# Patient Record
Sex: FEMALE | Race: OTHER | HISPANIC OR LATINO | Employment: FULL TIME | URBAN - METROPOLITAN AREA
[De-identification: names, ages, dates, MRNs, and addresses within clinical notes are randomized per-mention and may not be internally consistent; named-entity substitution may affect disease eponyms.]

---

## 2018-09-18 ENCOUNTER — APPOINTMENT (OUTPATIENT)
Dept: URGENT CARE | Facility: MEDICAL CENTER | Age: 38
End: 2018-09-18
Payer: OTHER MISCELLANEOUS

## 2018-09-18 ENCOUNTER — APPOINTMENT (OUTPATIENT)
Dept: RADIOLOGY | Facility: MEDICAL CENTER | Age: 38
End: 2018-09-18
Payer: OTHER MISCELLANEOUS

## 2018-09-18 ENCOUNTER — TRANSCRIBE ORDERS (OUTPATIENT)
Dept: URGENT CARE | Facility: MEDICAL CENTER | Age: 38
End: 2018-09-18

## 2018-09-18 DIAGNOSIS — M54.5 ACUTE RIGHT-SIDED LOW BACK PAIN, WITH SCIATICA PRESENCE UNSPECIFIED: Primary | ICD-10-CM

## 2018-09-18 DIAGNOSIS — M54.5 ACUTE RIGHT-SIDED LOW BACK PAIN, WITH SCIATICA PRESENCE UNSPECIFIED: ICD-10-CM

## 2018-09-18 PROCEDURE — 72100 X-RAY EXAM L-S SPINE 2/3 VWS: CPT

## 2018-09-18 PROCEDURE — 99283 EMERGENCY DEPT VISIT LOW MDM: CPT

## 2018-09-18 PROCEDURE — G0382 LEV 3 HOSP TYPE B ED VISIT: HCPCS

## 2018-09-24 ENCOUNTER — APPOINTMENT (OUTPATIENT)
Dept: URGENT CARE | Facility: MEDICAL CENTER | Age: 38
End: 2018-09-24
Payer: OTHER MISCELLANEOUS

## 2018-09-24 PROCEDURE — 99213 OFFICE O/P EST LOW 20 MIN: CPT

## 2018-10-01 ENCOUNTER — APPOINTMENT (OUTPATIENT)
Dept: URGENT CARE | Facility: MEDICAL CENTER | Age: 38
End: 2018-10-01
Payer: OTHER MISCELLANEOUS

## 2018-10-01 PROCEDURE — 99213 OFFICE O/P EST LOW 20 MIN: CPT

## 2018-10-15 ENCOUNTER — APPOINTMENT (OUTPATIENT)
Dept: URGENT CARE | Facility: MEDICAL CENTER | Age: 38
End: 2018-10-15
Payer: OTHER MISCELLANEOUS

## 2018-10-15 PROCEDURE — 99213 OFFICE O/P EST LOW 20 MIN: CPT

## 2018-10-29 ENCOUNTER — APPOINTMENT (OUTPATIENT)
Dept: URGENT CARE | Facility: MEDICAL CENTER | Age: 38
End: 2018-10-29
Payer: OTHER MISCELLANEOUS

## 2018-10-29 PROCEDURE — 99213 OFFICE O/P EST LOW 20 MIN: CPT

## 2018-11-12 ENCOUNTER — APPOINTMENT (OUTPATIENT)
Dept: URGENT CARE | Facility: MEDICAL CENTER | Age: 38
End: 2018-11-12
Payer: OTHER MISCELLANEOUS

## 2018-11-12 PROCEDURE — 99213 OFFICE O/P EST LOW 20 MIN: CPT

## 2018-11-15 ENCOUNTER — APPOINTMENT (OUTPATIENT)
Dept: URGENT CARE | Facility: MEDICAL CENTER | Age: 38
End: 2018-11-15
Payer: OTHER MISCELLANEOUS

## 2018-11-15 PROCEDURE — 99213 OFFICE O/P EST LOW 20 MIN: CPT

## 2022-03-21 ENCOUNTER — OFFICE VISIT (OUTPATIENT)
Dept: OBGYN CLINIC | Facility: MEDICAL CENTER | Age: 42
End: 2022-03-21
Payer: COMMERCIAL

## 2022-03-21 VITALS
BODY MASS INDEX: 29.64 KG/M2 | DIASTOLIC BLOOD PRESSURE: 60 MMHG | WEIGHT: 157 LBS | SYSTOLIC BLOOD PRESSURE: 110 MMHG | HEIGHT: 61 IN

## 2022-03-21 DIAGNOSIS — Z12.31 ENCOUNTER FOR SCREENING MAMMOGRAM FOR MALIGNANT NEOPLASM OF BREAST: ICD-10-CM

## 2022-03-21 DIAGNOSIS — N91.2 AMENORRHEA: ICD-10-CM

## 2022-03-21 DIAGNOSIS — R10.2 PELVIC PAIN IN FEMALE: ICD-10-CM

## 2022-03-21 DIAGNOSIS — Z01.419 ENCOUNTER FOR ROUTINE GYNECOLOGICAL EXAMINATION WITH PAPANICOLAOU SMEAR OF CERVIX: Primary | ICD-10-CM

## 2022-03-21 PROCEDURE — G0476 HPV COMBO ASSAY CA SCREEN: HCPCS | Performed by: OBSTETRICS & GYNECOLOGY

## 2022-03-21 PROCEDURE — 0503F POSTPARTUM CARE VISIT: CPT | Performed by: OBSTETRICS & GYNECOLOGY

## 2022-03-21 PROCEDURE — G0145 SCR C/V CYTO,THINLAYER,RESCR: HCPCS | Performed by: OBSTETRICS & GYNECOLOGY

## 2022-03-21 PROCEDURE — 99386 PREV VISIT NEW AGE 40-64: CPT | Performed by: OBSTETRICS & GYNECOLOGY

## 2022-03-21 RX ORDER — NORETHINDRONE ACETATE AND ETHINYL ESTRADIOL 1MG-20(24)
1 KIT ORAL DAILY
COMMUNITY
Start: 2022-01-23 | End: 2022-07-07 | Stop reason: SDUPTHER

## 2022-03-21 NOTE — PROGRESS NOTES
Assessment   42 y o  [id number] with amenorrhea x2yr on OCP (blisovi) presenting for annual exam     Plan   Diagnoses and all orders for this visit:    Encounter for routine gynecological examination with Papanicolaou smear of cervix  -     Liquid-based pap, screening  -     HPV High Risk    Encounter for screening mammogram for malignant neoplasm of breast  -     Mammo screening bilateral w 3d & cad; Future    Pelvic pain in female  -     US pelvis complete w transvaginal; Future    Amenorrhea  -     Follicle stimulating hormone; Future  -     Luteinizing hormone; Future  -     Estradiol; Future  -     TSH, 3rd generation with Free T4 reflex; Future  -     Prolactin; Future        __________________________________________________________________      Subjective     Katarina Flowers is a 42 y o  [id number] with amenorrhea x2yr on OCP (blisovi) presenting for annual exam  She is without complaint  Amenorrhea on OCP x2yrs    Reporting pain on left side  Had a paraguard before that had a retained arm that was on this side  Not severe, but does notice it occasionally  GYN  Complaints: as above  Denies dyspareunia, genital discharge, genital ulcers, irregular/heavy menses and vulvar/vaginal symptoms  Periods are absent x2yr  Dysmenorrhea:none  Cyclic symptoms include none  Sexually active: Yes - single partner - male  Hx STI: reports   Hx Abnormal pap: reports HPV  Last pap: 3yrs ago, +HPV    OB   (c/s x2)  Pregnancy complications: denies  Requests BTL      Complaints: denies  Denies urinary frequency, hematuria, urinary incontinence and dysuria    BREAST  Complaints: denies  Denies: breast lump, breast tenderness, changed mole, dryness, nipple discharge, pruritus, rash, skin color change and skin lesion(s)  Last mammogram: n/a  Personal hx: denies  Family hx: denies fhx of breast, uterine, ovarian, or colon cancers  Patient does do regular self-exams    GENERAL  PMH reviewed/updated and is as below     Patient does follow with a PCP  Works as a health assistant at [employer]  Denies domestic violence  Exercise: walking  Diet: nothing specific    SCREENING  Cervical Ca: pap collected  Breast Ca: mammo ordered  Colon Ca: not indicated by age      No past medical history on file  No past surgical history on file  Current Outpatient Medications:     Blisovi 24 Fe 1-20 MG-MCG(24) per tablet, Take 1 tablet by mouth daily, Disp: , Rfl:     No Known Allergies    Social History     Tobacco Use    Smoking status: Not on file    Smokeless tobacco: Not on file   Substance Use Topics    Alcohol use: Not on file    Drug use: Not on file           Objective  Ht 5' 1" (1 549 m)   Wt 71 2 kg (157 lb)   LMP  (Approximate) Comment: 2020  Breastfeeding No   BMI 29 66 kg/m²      Physical Exam:  Physical Exam  Exam conducted with a chaperone present  Constitutional:       General: She is not in acute distress  Appearance: Normal appearance  She is well-developed  She is not ill-appearing, toxic-appearing or diaphoretic  HENT:      Head: Normocephalic and atraumatic  Eyes:      General: No scleral icterus  Right eye: No discharge  Left eye: No discharge  Conjunctiva/sclera: Conjunctivae normal    Cardiovascular:      Rate and Rhythm: Normal rate  Pulmonary:      Effort: Pulmonary effort is normal  No accessory muscle usage or respiratory distress  Chest:   Breasts:      Right: No inverted nipple, mass, nipple discharge, skin change or tenderness  Left: No inverted nipple, mass, nipple discharge, skin change or tenderness  Abdominal:      General: There is no distension  Palpations: Abdomen is soft  There is no mass  Tenderness: There is no abdominal tenderness  There is no guarding or rebound  Genitourinary:     General: Normal vulva  Exam position: Lithotomy position  Labia:         Right: No rash, tenderness or lesion           Left: No rash, tenderness or lesion  Urethra: No prolapse, urethral swelling or urethral lesion  Vagina: No signs of injury  No vaginal discharge, erythema or tenderness  Cervix: No cervical motion tenderness or discharge  Uterus: Not enlarged, not fixed and not tender  Adnexa:         Right: No mass, tenderness or fullness  Left: No mass, tenderness or fullness  Rectum: No external hemorrhoid  Normal anal tone  Comments: Urethral meatus: normal  Skin:     General: Skin is warm and dry  Findings: No erythema or rash  Neurological:      Mental Status: She is alert  Psychiatric:         Mood and Affect: Mood normal          Behavior: Behavior normal          Thought Content:  Thought content normal          Judgment: Judgment normal

## 2022-03-23 LAB
HPV HR 12 DNA CVX QL NAA+PROBE: NEGATIVE
HPV16 DNA CVX QL NAA+PROBE: NEGATIVE
HPV18 DNA CVX QL NAA+PROBE: NEGATIVE

## 2022-03-24 LAB
LAB AP GYN PRIMARY INTERPRETATION: NORMAL
Lab: NORMAL

## 2022-03-26 ENCOUNTER — APPOINTMENT (OUTPATIENT)
Dept: LAB | Facility: MEDICAL CENTER | Age: 42
End: 2022-03-26
Payer: COMMERCIAL

## 2022-03-26 DIAGNOSIS — N91.2 AMENORRHEA: ICD-10-CM

## 2022-03-26 LAB
ESTRADIOL SERPL-MCNC: 21 PG/ML
FSH SERPL-ACNC: 3.6 MIU/ML
LH SERPL-ACNC: 4.8 MIU/ML
PROLACTIN SERPL-MCNC: 16.3 NG/ML
TSH SERPL DL<=0.05 MIU/L-ACNC: 1.19 UIU/ML (ref 0.36–3.74)

## 2022-03-26 PROCEDURE — 84146 ASSAY OF PROLACTIN: CPT

## 2022-03-26 PROCEDURE — 83001 ASSAY OF GONADOTROPIN (FSH): CPT

## 2022-03-26 PROCEDURE — 82670 ASSAY OF TOTAL ESTRADIOL: CPT

## 2022-03-26 PROCEDURE — 36415 COLL VENOUS BLD VENIPUNCTURE: CPT

## 2022-03-26 PROCEDURE — 84443 ASSAY THYROID STIM HORMONE: CPT

## 2022-03-26 PROCEDURE — 83002 ASSAY OF GONADOTROPIN (LH): CPT

## 2022-03-29 ENCOUNTER — OFFICE VISIT (OUTPATIENT)
Dept: FAMILY MEDICINE CLINIC | Facility: CLINIC | Age: 42
End: 2022-03-29
Payer: COMMERCIAL

## 2022-03-29 VITALS
OXYGEN SATURATION: 99 % | HEIGHT: 62 IN | WEIGHT: 160 LBS | SYSTOLIC BLOOD PRESSURE: 120 MMHG | DIASTOLIC BLOOD PRESSURE: 82 MMHG | BODY MASS INDEX: 29.44 KG/M2 | RESPIRATION RATE: 16 BRPM | TEMPERATURE: 97.2 F | HEART RATE: 77 BPM

## 2022-03-29 DIAGNOSIS — R25.1 SHAKES: ICD-10-CM

## 2022-03-29 DIAGNOSIS — E66.3 OVERWEIGHT (BMI 25.0-29.9): ICD-10-CM

## 2022-03-29 DIAGNOSIS — R10.13 EPIGASTRIC PAIN: Primary | ICD-10-CM

## 2022-03-29 PROCEDURE — 99203 OFFICE O/P NEW LOW 30 MIN: CPT | Performed by: FAMILY MEDICINE

## 2022-03-29 RX ORDER — OMEPRAZOLE 20 MG/1
20 CAPSULE, DELAYED RELEASE ORAL
Qty: 30 CAPSULE | Refills: 1 | Status: SHIPPED | OUTPATIENT
Start: 2022-03-29 | End: 2022-04-25

## 2022-03-29 NOTE — PATIENT INSTRUCTIONS
Here for establishing care and also will rec updated labs and also consult with GI for midepigastric pain and may use omeprazole and tums prn gerd  Lose weight with diet and exercise

## 2022-03-29 NOTE — PROGRESS NOTES
Assessment/Plan:  Chief Complaint   Patient presents with    New Patient Visit     stomach pain and burning, bp and blood sugar      Patient Instructions   Here for establishing care and also will rec updated labs and also consult with GI for midepigastric pain and may use omeprazole and tums prn gerd  Lose weight with diet and exercise  No problem-specific Assessment & Plan notes found for this encounter  Diagnoses and all orders for this visit:    Epigastric pain  -     Ambulatory Referral to Gastroenterology; Future  -     Comprehensive metabolic panel; Future  -     CBC and differential; Future  -     omeprazole (PriLOSEC) 20 mg delayed release capsule; Take 1 capsule (20 mg total) by mouth daily before breakfast    Shakes  -     Comprehensive metabolic panel; Future  -     CBC and differential; Future  -     TSH, 3rd generation; Future  -     T4, free    Overweight (BMI 25 0-29 9)  -     Comprehensive metabolic panel; Future  -     Lipid Panel with Direct LDL reflex; Future  -     TSH, 3rd generation; Future  -     T4, free          Subjective:      Patient ID: Ama Asp is a 42 y o  female  New Patient Visit (stomach pain and burning, bp and blood sugar ) Also has some abdominal pain and gerd   and hs 2 children age 25 and 25 and 15 yo girls  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  The following portions of the patient's history were reviewed and updated as appropriate: allergies, current medications, past family history, past medical history, past social history, past surgical history and problem list     Review of Systems   Constitutional: Negative  HENT: Negative  Eyes: Negative  Respiratory: Negative  Cardiovascular: Negative  Gastrointestinal: Positive for abdominal pain (epigastric)  Endocrine: Negative  Genitourinary: Negative  Musculoskeletal: Negative  Skin: Negative  Allergic/Immunologic: Negative      Neurological: Negative  Some shakes when not eating breakfast   Hematological: Negative  Psychiatric/Behavioral: Negative  Objective:      /82 (BP Location: Left arm, Patient Position: Sitting, Cuff Size: Adult)   Pulse 77   Temp (!) 97 2 °F (36 2 °C) (Temporal)   Resp 16   Ht 5' 1 5" (1 562 m)   Wt 72 6 kg (160 lb)   SpO2 99%   BMI 29 74 kg/m²          Physical Exam  Constitutional:       Appearance: She is well-developed  HENT:      Head: Normocephalic and atraumatic  Right Ear: External ear normal       Left Ear: External ear normal    Eyes:      Conjunctiva/sclera: Conjunctivae normal       Pupils: Pupils are equal, round, and reactive to light  Cardiovascular:      Rate and Rhythm: Normal rate and regular rhythm  Heart sounds: Normal heart sounds  Pulmonary:      Effort: Pulmonary effort is normal       Breath sounds: Normal breath sounds  Abdominal:      General: Abdomen is flat  Bowel sounds are normal       Palpations: Abdomen is soft  Musculoskeletal:         General: Normal range of motion  Cervical back: Normal range of motion and neck supple  Skin:     General: Skin is warm and dry  Neurological:      Mental Status: She is alert and oriented to person, place, and time  Deep Tendon Reflexes: Reflexes are normal and symmetric     Psychiatric:         Behavior: Behavior normal

## 2022-03-29 NOTE — PROGRESS NOTES
BMI Counseling: Body mass index is 29 74 kg/m²  The BMI is above normal  Nutrition recommendations include reducing portion sizes, decreasing overall calorie intake, 3-5 servings of fruits/vegetables daily, reducing fast food intake, consuming healthier snacks and decreasing soda and/or juice intake  Exercise recommendations include exercising 3-5 times per week

## 2022-04-02 ENCOUNTER — APPOINTMENT (OUTPATIENT)
Dept: LAB | Facility: MEDICAL CENTER | Age: 42
End: 2022-04-02
Payer: COMMERCIAL

## 2022-04-02 DIAGNOSIS — R10.13 EPIGASTRIC PAIN: ICD-10-CM

## 2022-04-02 DIAGNOSIS — R25.1 SHAKES: ICD-10-CM

## 2022-04-02 DIAGNOSIS — E66.3 OVERWEIGHT (BMI 25.0-29.9): ICD-10-CM

## 2022-04-02 LAB
ALBUMIN SERPL BCP-MCNC: 3.3 G/DL (ref 3.5–5)
ALP SERPL-CCNC: 64 U/L (ref 46–116)
ALT SERPL W P-5'-P-CCNC: 37 U/L (ref 12–78)
ANION GAP SERPL CALCULATED.3IONS-SCNC: 2 MMOL/L (ref 4–13)
AST SERPL W P-5'-P-CCNC: 17 U/L (ref 5–45)
BASOPHILS # BLD AUTO: 0.04 THOUSANDS/ΜL (ref 0–0.1)
BASOPHILS NFR BLD AUTO: 0 % (ref 0–1)
BILIRUB SERPL-MCNC: 0.45 MG/DL (ref 0.2–1)
BUN SERPL-MCNC: 11 MG/DL (ref 5–25)
CALCIUM ALBUM COR SERPL-MCNC: 9 MG/DL (ref 8.3–10.1)
CALCIUM SERPL-MCNC: 8.4 MG/DL (ref 8.3–10.1)
CHLORIDE SERPL-SCNC: 109 MMOL/L (ref 100–108)
CHOLEST SERPL-MCNC: 174 MG/DL
CO2 SERPL-SCNC: 28 MMOL/L (ref 21–32)
CREAT SERPL-MCNC: 0.97 MG/DL (ref 0.6–1.3)
EOSINOPHIL # BLD AUTO: 0.22 THOUSAND/ΜL (ref 0–0.61)
EOSINOPHIL NFR BLD AUTO: 2 % (ref 0–6)
ERYTHROCYTE [DISTWIDTH] IN BLOOD BY AUTOMATED COUNT: 13.4 % (ref 11.6–15.1)
GFR SERPL CREATININE-BSD FRML MDRD: 72 ML/MIN/1.73SQ M
GLUCOSE P FAST SERPL-MCNC: 96 MG/DL (ref 65–99)
HCT VFR BLD AUTO: 40.7 % (ref 34.8–46.1)
HDLC SERPL-MCNC: 49 MG/DL
HGB BLD-MCNC: 13 G/DL (ref 11.5–15.4)
IMM GRANULOCYTES # BLD AUTO: 0.02 THOUSAND/UL (ref 0–0.2)
IMM GRANULOCYTES NFR BLD AUTO: 0 % (ref 0–2)
LDLC SERPL CALC-MCNC: 104 MG/DL (ref 0–100)
LYMPHOCYTES # BLD AUTO: 4.09 THOUSANDS/ΜL (ref 0.6–4.47)
LYMPHOCYTES NFR BLD AUTO: 39 % (ref 14–44)
MCH RBC QN AUTO: 29.5 PG (ref 26.8–34.3)
MCHC RBC AUTO-ENTMCNC: 31.9 G/DL (ref 31.4–37.4)
MCV RBC AUTO: 93 FL (ref 82–98)
MONOCYTES # BLD AUTO: 0.42 THOUSAND/ΜL (ref 0.17–1.22)
MONOCYTES NFR BLD AUTO: 4 % (ref 4–12)
NEUTROPHILS # BLD AUTO: 5.63 THOUSANDS/ΜL (ref 1.85–7.62)
NEUTS SEG NFR BLD AUTO: 55 % (ref 43–75)
NRBC BLD AUTO-RTO: 0 /100 WBCS
PLATELET # BLD AUTO: 332 THOUSANDS/UL (ref 149–390)
PMV BLD AUTO: 11.6 FL (ref 8.9–12.7)
POTASSIUM SERPL-SCNC: 3.7 MMOL/L (ref 3.5–5.3)
PROT SERPL-MCNC: 7.1 G/DL (ref 6.4–8.2)
RBC # BLD AUTO: 4.4 MILLION/UL (ref 3.81–5.12)
SODIUM SERPL-SCNC: 139 MMOL/L (ref 136–145)
T4 FREE SERPL-MCNC: 1.05 NG/DL (ref 0.76–1.46)
TRIGL SERPL-MCNC: 105 MG/DL
TSH SERPL DL<=0.05 MIU/L-ACNC: 1.5 UIU/ML (ref 0.36–3.74)
WBC # BLD AUTO: 10.42 THOUSAND/UL (ref 4.31–10.16)

## 2022-04-02 PROCEDURE — 36415 COLL VENOUS BLD VENIPUNCTURE: CPT

## 2022-04-02 PROCEDURE — 80061 LIPID PANEL: CPT

## 2022-04-02 PROCEDURE — 80053 COMPREHEN METABOLIC PANEL: CPT

## 2022-04-02 PROCEDURE — 84439 ASSAY OF FREE THYROXINE: CPT | Performed by: FAMILY MEDICINE

## 2022-04-02 PROCEDURE — 85025 COMPLETE CBC W/AUTO DIFF WBC: CPT

## 2022-04-02 PROCEDURE — 84443 ASSAY THYROID STIM HORMONE: CPT

## 2022-04-08 ENCOUNTER — HOSPITAL ENCOUNTER (OUTPATIENT)
Dept: MAMMOGRAPHY | Facility: MEDICAL CENTER | Age: 42
Discharge: HOME | End: 2022-04-08
Payer: COMMERCIAL

## 2022-04-08 ENCOUNTER — HOSPITAL ENCOUNTER (OUTPATIENT)
Dept: ULTRASOUND IMAGING | Facility: MEDICAL CENTER | Age: 42
Discharge: HOME | End: 2022-04-08
Payer: COMMERCIAL

## 2022-04-08 VITALS — HEIGHT: 62 IN | BODY MASS INDEX: 29.44 KG/M2 | WEIGHT: 160 LBS

## 2022-04-08 DIAGNOSIS — R10.2 PELVIC PAIN IN FEMALE: ICD-10-CM

## 2022-04-08 DIAGNOSIS — Z12.31 ENCOUNTER FOR SCREENING MAMMOGRAM FOR MALIGNANT NEOPLASM OF BREAST: ICD-10-CM

## 2022-04-08 PROCEDURE — 77067 SCR MAMMO BI INCL CAD: CPT

## 2022-04-08 PROCEDURE — 76830 TRANSVAGINAL US NON-OB: CPT

## 2022-04-08 PROCEDURE — 77063 BREAST TOMOSYNTHESIS BI: CPT

## 2022-04-08 PROCEDURE — 76856 US EXAM PELVIC COMPLETE: CPT

## 2022-04-25 DIAGNOSIS — R10.13 EPIGASTRIC PAIN: ICD-10-CM

## 2022-04-25 RX ORDER — OMEPRAZOLE 20 MG/1
CAPSULE, DELAYED RELEASE ORAL
Qty: 30 CAPSULE | Refills: 1 | Status: SHIPPED | OUTPATIENT
Start: 2022-04-25 | End: 2022-06-08

## 2022-05-12 ENCOUNTER — OFFICE VISIT (OUTPATIENT)
Dept: OBGYN CLINIC | Facility: MEDICAL CENTER | Age: 42
End: 2022-05-12
Payer: COMMERCIAL

## 2022-05-12 VITALS
HEIGHT: 62 IN | DIASTOLIC BLOOD PRESSURE: 70 MMHG | BODY MASS INDEX: 29.28 KG/M2 | SYSTOLIC BLOOD PRESSURE: 108 MMHG | WEIGHT: 159.1 LBS

## 2022-05-12 DIAGNOSIS — T83.31XD MECHANICAL BREAKDOWN OF INTRAUTERINE CONTRACEPTIVE DEVICE, SUBSEQUENT ENCOUNTER: ICD-10-CM

## 2022-05-12 DIAGNOSIS — Z30.2 REQUEST FOR STERILIZATION: Primary | ICD-10-CM

## 2022-05-12 PROCEDURE — 99214 OFFICE O/P EST MOD 30 MIN: CPT | Performed by: OBSTETRICS & GYNECOLOGY

## 2022-05-13 NOTE — PROGRESS NOTES
Assessment:  42 y o  [id number] who presents to discuss tubal ligation  Prefers salpingectomy  Hx retained IUD arm; located on US and prefers to remove at same time  Plan:  Diagnoses and all orders for this visit:    Request for sterilization  Mechanical breakdown of intrauterine contraceptive device, subsequent encounter  - Task sent for scheduling - laparoscopic bilateral salpingectomy, hysteroscopy, removal of retained IUD portion  - Return for H&P      __________________________________________________________________    Subjective   Rik Garcia is a 42 y o  [id number] who presents to discuss tubal ligation  Patient reports she is confident she has completed childbearing and prefers a permanent form of birth control  This was discussed briefly at yearly, where MA 31 paperwork as completed  We reviewed the procedure in detail, the surgical technique, same day surgery, and expected recovery  We reviewed the risks of the procedure, including but not limited to bleeding, infection, injury to bowel, bladder, neurovascular structures, or ureters, failure, ectopic pregnancy, or regret  We reviewed BTL vs salpingectomy  Discussed with salpingectomy the fallopian tubes are removed, and thus this procedure is completely irreversible and further pregnancies would need to be achieved by IVF  We reviewed that removal of the fallopian tubes carry with it the theoretical benefit of reduced risk of ovarian cancer in the future  Patient had the opportunity to ask questions, which were answered to her satisfaction  We reviewed her recent testing for amenorrhea and pain  Discussed IUD arm localized on US; appears to be within endometrial cavity and likely able to be retrieved hysteroscopically  Discussed this procedure in detail, which could be completed with her tubal  She is agreeable to this plan         The following portions of the patient's history were reviewed and updated as appropriate: allergies, current medications, past medical history, past social history, past surgical history and problem list     Review of Systems  Review of Systems   Constitutional: Negative for chills, fatigue and fever  Respiratory: Negative for cough and shortness of breath  Gastrointestinal: Negative for abdominal distention, abdominal pain, diarrhea and nausea  Genitourinary: Positive for pelvic pain (occasional)  Negative for dyspareunia, genital sores, vaginal bleeding, vaginal discharge and vaginal pain  Neurological: Negative for dizziness and light-headedness  Objective  /70   Ht 5' 1 5" (1 562 m)   Wt 72 2 kg (159 lb 1 6 oz)   Breastfeeding No   BMI 29 57 kg/m²      Physical Exam:  Physical Exam  Constitutional:       General: She is not in acute distress  Appearance: Normal appearance  She is not ill-appearing, toxic-appearing or diaphoretic  Eyes:      General: No scleral icterus  Right eye: No discharge  Left eye: No discharge  Conjunctiva/sclera: Conjunctivae normal    Cardiovascular:      Rate and Rhythm: Normal rate  Pulmonary:      Effort: Pulmonary effort is normal  No respiratory distress  Abdominal:      General: There is no distension  Palpations: There is no mass  Tenderness: There is no abdominal tenderness  There is no guarding or rebound  Hernia: No hernia is present  Musculoskeletal:         General: No swelling  Skin:     General: Skin is warm and dry  Coloration: Skin is not jaundiced or pale  Findings: No bruising or erythema  Neurological:      Mental Status: She is alert  Psychiatric:         Mood and Affect: Mood normal          Behavior: Behavior normal          Thought Content: Thought content normal          Judgment: Judgment normal            Reviewed  Pelvic US (4/8/22, images directly reviewed)  FSH, LH, estradiol, TSH   Prolactin (3/26/22)

## 2022-05-16 ENCOUNTER — PREP FOR PROCEDURE (OUTPATIENT)
Dept: OBGYN CLINIC | Facility: MEDICAL CENTER | Age: 42
End: 2022-05-16

## 2022-05-16 ENCOUNTER — TELEPHONE (OUTPATIENT)
Dept: OBGYN CLINIC | Facility: MEDICAL CENTER | Age: 42
End: 2022-05-16

## 2022-05-16 DIAGNOSIS — T83.31XD MECHANICAL BREAKDOWN OF INTRAUTERINE CONTRACEPTIVE DEVICE, SUBSEQUENT ENCOUNTER: ICD-10-CM

## 2022-05-16 DIAGNOSIS — Z30.2 ENCOUNTER FOR STERILIZATION: Primary | ICD-10-CM

## 2022-05-16 NOTE — TELEPHONE ENCOUNTER
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

## 2022-05-17 RX ORDER — SODIUM CHLORIDE, SODIUM LACTATE, POTASSIUM CHLORIDE, CALCIUM CHLORIDE 600; 310; 30; 20 MG/100ML; MG/100ML; MG/100ML; MG/100ML
125 INJECTION, SOLUTION INTRAVENOUS CONTINUOUS
Status: CANCELLED | OUTPATIENT
Start: 2022-07-25

## 2022-06-08 ENCOUNTER — CONSULT (OUTPATIENT)
Dept: GASTROENTEROLOGY | Facility: MEDICAL CENTER | Age: 42
End: 2022-06-08
Payer: COMMERCIAL

## 2022-06-08 ENCOUNTER — TELEPHONE (OUTPATIENT)
Dept: GASTROENTEROLOGY | Facility: MEDICAL CENTER | Age: 42
End: 2022-06-08

## 2022-06-08 VITALS
TEMPERATURE: 98.5 F | HEART RATE: 79 BPM | DIASTOLIC BLOOD PRESSURE: 84 MMHG | WEIGHT: 158 LBS | SYSTOLIC BLOOD PRESSURE: 121 MMHG | BODY MASS INDEX: 29.37 KG/M2

## 2022-06-08 DIAGNOSIS — K21.9 GASTROESOPHAGEAL REFLUX DISEASE, UNSPECIFIED WHETHER ESOPHAGITIS PRESENT: ICD-10-CM

## 2022-06-08 DIAGNOSIS — R10.13 EPIGASTRIC PAIN: Primary | ICD-10-CM

## 2022-06-08 PROCEDURE — 99244 OFF/OP CNSLTJ NEW/EST MOD 40: CPT | Performed by: INTERNAL MEDICINE

## 2022-06-08 RX ORDER — OMEPRAZOLE 40 MG/1
40 CAPSULE, DELAYED RELEASE ORAL DAILY
Qty: 30 CAPSULE | Refills: 3 | Status: SHIPPED | OUTPATIENT
Start: 2022-06-08 | End: 2022-07-04

## 2022-06-08 NOTE — TELEPHONE ENCOUNTER
WRAP UP      Scheduled date of 8/16/22 (as of today) 6/8/22  Physician performing Dr Ranell Opitz:  Location of procedure  [facility]iss[facility] Xie End:  Bowel prep reviewed with patient: EGD  Instructions reviewed with patient by: MA  Clearances:

## 2022-06-08 NOTE — PROGRESS NOTES
[facility] - Outpatient Consultation  Ayana Bhakta 42 y o  female MRN: [id number]  Encounter: [id number]          ASSESSMENT AND PLAN:  40-year-old female with no significant past medical history who presents for evaluation  1  Epigastric pain  2  Gastroesophageal reflux disease, unspecified whether esophagitis present  She reports a history of chronic epigastric discomfort, nausea vomiting and chest burning  Symptoms may be related to gastroesophageal reflux, peptic ulcer disease, symptomatic cholelithiasis  She has had moderate improvement on omeprazole 20 mg daily I recommended she increase this to 40 mg daily be taken 30 minutes before breakfast   I discussed dietary/lifestyle anti-reflux measures with her today  I have ordered abdominal ultrasound to evaluate for biliary etiology  EGD will be scheduled for evaluation, to obtain gastric biopsies for H pylori as well  - US right upper quadrant; Future  - omeprazole (PriLOSEC) 40 MG capsule; Take 1 capsule (40 mg total) by mouth daily  Dispense: 30 capsule; Refill: 3  - EGD; Future    Follow-up after EGD and  ______________________________________________________________________    HPI:  40-year-old female with no significant past medical history who presents for evaluation  She reports chronic symptoms of epigastric abdominal pain, nausea and vomiting  This is been occurring for a few months  The symptoms usually occur at night  The symptoms can be worsened with certain foods like greasy and spicy foods or large meals  She is a burning discomfort in her epigastrium which radiates to her back  This last for a few hours  She has not taken any medication for relief denies NSAID use  She has nausea with nonbloody nonbilious emesis  Her appetite is moderate her weight is stable  She has regular, formed bowel movements without melena or hematochezia    She started omeprazole 20 mg daily 2 months ago and has had moderate improvement of the symptoms however they persist       She reports an endoscopy 14 years ago for similar symptoms and was told that she had gastritis   She reports family members with history of gastric ulcers no family history of gastric cancer or colon cancer   She takes no anti-platelet or anticoagulant medications  She has no prior colonoscopy          She has no recent abdominal imaging available for review   2022 labs show white blood cell count 10 4, hemoglobin 13, platelets 729  Liver enzymes normal        REVIEW OF SYSTEMS:    CONSTITUTIONAL: Denies any fever, chills, rigors, and weight loss  HEENT: No earache or tinnitus  Denies hearing loss or visual disturbances  CARDIOVASCULAR: No chest pain or palpitations  RESPIRATORY: Denies any cough, hemoptysis, shortness of breath or dyspnea on exertion  GASTROINTESTINAL: As noted in the History of Present Illness  GENITOURINARY: No problems with urination  Denies any hematuria or dysuria  NEUROLOGIC: No dizziness or vertigo, denies headaches  MUSCULOSKELETAL: Denies any muscle or joint pain  SKIN: Denies skin rashes or itching  ENDOCRINE: Denies excessive thirst  Denies intolerance to heat or cold  PSYCHOSOCIAL: Denies depression or anxiety  Denies any recent memory loss  Historical Information   History reviewed  No pertinent past medical history    Past Surgical History:   Procedure Laterality Date     SECTION       Social History   Social History     Substance and Sexual Activity   Alcohol Use Never     Social History     Substance and Sexual Activity   Drug Use Never     Social History     Tobacco Use   Smoking Status Never Smoker   Smokeless Tobacco Never Used     Family History   Problem Relation Age of Onset    Diabetes Mother     Diabetes Father     Heart disease Father     Diabetes Sister     Diabetes Brother     No Known Problems Daughter     No Known Problems Maternal Grandmother     No Known Problems Maternal Grandfather     No Known Problems Paternal Grandmother     No Known Problems Paternal Grandfather     No Known Problems Sister     No Known Problems Daughter        Meds/Allergies       Current Outpatient Medications:     Blisovi 24 Fe 1-20 MG-MCG(24) per tablet    omeprazole (PriLOSEC) 20 mg delayed release capsule    No Known Allergies        Objective     Blood pressure 121/84, pulse 79, temperature 98 5 °F (36 9 °C), weight 71 7 kg (158 lb), not currently breastfeeding  Body mass index is 29 37 kg/m²  PHYSICAL EXAM:      General Appearance:   Alert, cooperative, no distress   HEENT:   Normocephalic, atraumatic, anicteric  Neck:  Supple, symmetrical, trachea midline   Lungs:   Clear to auscultation bilaterally; no rales, rhonchi or wheezing; respirations unlabored    Heart[de-identified]   Regular rate and rhythm; no murmur, rub, or gallop  Abdomen:   Soft, non-tender, non-distended; normal bowel sounds; no masses, no organomegaly    Genitalia:   Deferred    Rectal:   Deferred    Extremities:  No cyanosis, clubbing or edema    Pulses:  2+ and symmetric    Skin:  No jaundice, rashes, or lesions    Lymph nodes:  No palpable cervical lymphadenopathy        Lab Results:   No visits with results within 1 Day(s) from this visit     Latest known visit with results is:   Appointment on 04/02/2022   Component Date Value    Sodium 04/02/2022 139     Potassium 04/02/2022 3 7     Chloride 04/02/2022 109 (A)    CO2 04/02/2022 28     ANION GAP 04/02/2022 2 (A)    BUN 04/02/2022 11     Creatinine 04/02/2022 0 97     Glucose, Fasting 04/02/2022 96     Calcium 04/02/2022 8 4     Corrected Calcium 04/02/2022 9 0     AST 04/02/2022 17     ALT 04/02/2022 37     Alkaline Phosphatase 04/02/2022 64     Total Protein 04/02/2022 7 1     Albumin 04/02/2022 3 3 (A)    Total Bilirubin 04/02/2022 0 45     eGFR 04/02/2022 72     WBC 04/02/2022 10 42 (A)    RBC 04/02/2022 4 40     Hemoglobin 04/02/2022 13 0     Hematocrit 04/02/2022 40 7     MCV 04/02/2022 93     MCH 04/02/2022 29 5     MCHC 04/02/2022 31 9     RDW 04/02/2022 13 4     MPV 04/02/2022 11 6     Platelets 95/86/0853 332     nRBC 04/02/2022 0     Neutrophils Relative 04/02/2022 55     Immat GRANS % 04/02/2022 0     Lymphocytes Relative 04/02/2022 39     Monocytes Relative 04/02/2022 4     Eosinophils Relative 04/02/2022 2     Basophils Relative 04/02/2022 0     Neutrophils Absolute 04/02/2022 5 63     Immature Grans Absolute 04/02/2022 0 02     Lymphocytes Absolute 04/02/2022 4 09     Monocytes Absolute 04/02/2022 0 42     Eosinophils Absolute 04/02/2022 0 22     Basophils Absolute 04/02/2022 0 04     Cholesterol 04/02/2022 174     Triglycerides 04/02/2022 105     HDL, Direct 04/02/2022 49 (A)    LDL Calculated 04/02/2022 104 (A)    TSH 3RD GENERATON 04/02/2022 1 500          Radiology Results:   No results found

## 2022-06-08 NOTE — PATIENT INSTRUCTIONS
GERD (Gastroesophageal Reflux Disease)   AMBULATORY CARE:   Gastroesophageal reflux disease (GERD)  is reflux that happens more than 2 times a week for a few weeks  Reflux means acid and food in your stomach back up into your esophagus  GERD can cause other health problems over time if it is not treated  Common causes of GERD:  GERD often happens because the lower muscle (sphincter) of the esophagus does not close properly  The sphincter normally opens to let food into the stomach  It then closes to keep food and stomach acid in the stomach  If the sphincter does not close properly, stomach acid and food back up (reflux) into the esophagus  The following may increase your risk for GERD:  Certain foods such as spicy foods, chocolate, foods that contain caffeine, peppermint, and fried foods    Hiatal hernia    Certain medicines such as calcium channel blockers (used to treat high blood pressure), allergy medicines, sedatives, or antidepressants    Pregnancy, obesity, or scleroderma    Lying down after a meal    Drinking alcohol or smoking cigarettes    Signs and symptoms:   Heartburn (burning pain in your chest)    Pain after meals that spreads to your neck, jaw, or shoulder    Pain that gets better when you change positions    Bitter or acid taste in your mouth    A dry cough    Trouble swallowing or pain with swallowing    Hoarseness or a sore throat    Burping or hiccups    Feeling full soon after you start eating    Call your local emergency number (911 in the [address] [facility]) if:   You have severe chest pain and sudden trouble breathing  Seek care immediately if:   You have trouble breathing after you vomit  You have trouble swallowing, or pain with swallowing  Your bowel movements are black, bloody, or tarry-looking  Your vomit looks like coffee grounds or has blood in it  Call your doctor or gastroenterologist if:   You feel full and cannot burp or vomit      You vomit large amounts, or you vomit often     You are losing weight without trying  Your symptoms get worse or do not improve with treatment  You have questions or concerns about your condition or care  Treatment for GERD:   Medicines  are used to decrease stomach acid  Medicine may also be used to help your lower esophageal sphincter and stomach contract (tighten) more  Surgery  is done to wrap the upper part of the stomach around the esophageal sphincter  This will strengthen the sphincter and prevent reflux  Manage GERD:       Do not have foods or drinks that may increase heartburn  These include chocolate, peppermint, fried or fatty foods, drinks that contain caffeine, or carbonated drinks (soda)  Other foods include spicy foods, onions, tomatoes, and tomato-based foods  Do not have foods or drinks that can irritate your esophagus, such as citrus fruits, juices, and alcohol  Do not eat large meals  When you eat a lot of food at one time, your stomach needs more acid to digest it  Eat 6 small meals each day instead of 3 large meals, and eat slowly  Do not eat meals 2 to 3 hours before bedtime  Elevate the head of your bed  Place 6-inch blocks under the head of your bed frame  You may also use more than one pillow under your head and shoulders while you sleep  Maintain a healthy weight  If you are overweight, weight loss may help relieve symptoms of GERD  Do not smoke  Smoking weakens the lower esophageal sphincter and increases the risk of GERD  Ask your healthcare provider for information if you currently smoke and need help to quit  E-cigarettes or smokeless tobacco still contain nicotine  Talk to your healthcare provider before you use these products  Do not put pressure on your abdomen  Pressure pushes acid up into your esophagus  Do not wear clothing that is tight around your waist  Do not bend over  Bend at the knees if you need to pick something up      Follow up with your doctor or gastroenterologist as directed:  Write down your questions so you remember to ask them during your visits  © Copyright [employer] 2022 Information is for End User's use only and may not be sold, redistributed or otherwise used for commercial purposes  All illustrations and images included in CareNotes® are the copyrighted property of A D [employer] M [employer] Inc  or [employer] Orte[employer]  The above information is an  only  It is not intended as medical advice for individual conditions or treatments  Talk to your doctor, nurse or pharmacist before following any medical regimen to see if it is safe and effective for you

## 2022-06-30 ENCOUNTER — OFFICE VISIT (OUTPATIENT)
Dept: FAMILY MEDICINE CLINIC | Facility: CLINIC | Age: 42
End: 2022-06-30
Payer: COMMERCIAL

## 2022-06-30 VITALS
BODY MASS INDEX: 29.26 KG/M2 | RESPIRATION RATE: 16 BRPM | DIASTOLIC BLOOD PRESSURE: 84 MMHG | HEIGHT: 62 IN | SYSTOLIC BLOOD PRESSURE: 122 MMHG | HEART RATE: 83 BPM | OXYGEN SATURATION: 99 % | TEMPERATURE: 97.7 F | WEIGHT: 159 LBS

## 2022-06-30 DIAGNOSIS — K21.9 GASTROESOPHAGEAL REFLUX DISEASE, UNSPECIFIED WHETHER ESOPHAGITIS PRESENT: ICD-10-CM

## 2022-06-30 DIAGNOSIS — E66.3 OVERWEIGHT (BMI 25.0-29.9): ICD-10-CM

## 2022-06-30 DIAGNOSIS — Z00.00 HEALTH CARE MAINTENANCE: Primary | ICD-10-CM

## 2022-06-30 PROCEDURE — 99396 PREV VISIT EST AGE 40-64: CPT | Performed by: FAMILY MEDICINE

## 2022-06-30 NOTE — PROGRESS NOTES
Assessment/Plan:  Chief Complaint   Patient presents with    Physical Exam     Patient Instructions   Here for general PE and is covid vaccinated with booster  Patient to lose weight to get BMI lower than 25  Rec low sugar and low cholesterol diet and exercise to increase hdl  Recheck in 1 year for general PE and f-up with GI in August for endocopy for gerd symptoms  The medication helps and was encouraged to call if any blood in the stool  See GYN and get mammograms as directed  Use sunscreen and monitor for ticks  No problem-specific Assessment & Plan notes found for this encounter  Diagnoses and all orders for this visit:    Health care maintenance    Gastroesophageal reflux disease, unspecified whether esophagitis present  Comments:  saw GI in past and med was doubled    Overweight (BMI 25 0-29  9)          Subjective:      Patient ID: Karena Cline is a 42 y o  female  Here for general PE and has gerd and saw GI and also takes med as directed, it was doubled in dosing by her GI  Sees GYN and gets mammogram as directed   and has 2 children  Has an 25and 15year old girls  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  Walks for exercise and tries to eat healthy  Has endoscopy in August 2022 with GI  Labs reviewed, rec low cholesterol and low sugar diet  The following portions of the patient's history were reviewed and updated as appropriate: allergies, current medications, past family history, past medical history, past social history, past surgical history and problem list     Review of Systems   Constitutional: Negative  HENT: Negative  Eyes: Negative  Respiratory: Negative  Cardiovascular: Negative  Gastrointestinal: Negative  Endocrine: Negative  Genitourinary: Negative  Musculoskeletal: Negative  Skin: Negative  Allergic/Immunologic: Negative  Neurological: Negative  Hematological: Negative  Psychiatric/Behavioral: Negative  Objective:      /84 (BP Location: Left arm, Patient Position: Sitting, Cuff Size: Adult)   Pulse 83   Temp 97 7 °F (36 5 °C) (Temporal)   Resp 16   Ht 5' 1 5" (1 562 m)   Wt 72 1 kg (159 lb)   SpO2 99%   BMI 29 56 kg/m²          Physical Exam  Constitutional:       Appearance: She is well-developed  Comments: overweight   HENT:      Head: Normocephalic and atraumatic  Right Ear: External ear normal       Left Ear: External ear normal       Nose: Nose normal    Eyes:      Conjunctiva/sclera: Conjunctivae normal       Pupils: Pupils are equal, round, and reactive to light  Cardiovascular:      Rate and Rhythm: Normal rate and regular rhythm  Heart sounds: Normal heart sounds  Pulmonary:      Effort: Pulmonary effort is normal       Breath sounds: Normal breath sounds  Abdominal:      General: Abdomen is flat  Bowel sounds are normal       Palpations: Abdomen is soft  Musculoskeletal:         General: Normal range of motion  Cervical back: Normal range of motion and neck supple  Skin:     General: Skin is warm and dry  Neurological:      Mental Status: She is alert and oriented to person, place, and time  Deep Tendon Reflexes: Reflexes are normal and symmetric     Psychiatric:         Behavior: Behavior normal

## 2022-06-30 NOTE — PATIENT INSTRUCTIONS
Here for general PE and is covid vaccinated with booster  Patient to lose weight to get BMI lower than 25  Rec low sugar and low cholesterol diet and exercise to increase hdl  Recheck in 1 year for general PE and f-up with GI in August for endocopy for gerd symptoms  The medication helps and was encouraged to call if any blood in the stool  See GYN and get mammograms as directed  Use sunscreen and monitor for ticks

## 2022-07-04 DIAGNOSIS — R10.13 EPIGASTRIC PAIN: ICD-10-CM

## 2022-07-04 DIAGNOSIS — K21.9 GASTROESOPHAGEAL REFLUX DISEASE, UNSPECIFIED WHETHER ESOPHAGITIS PRESENT: ICD-10-CM

## 2022-07-04 RX ORDER — OMEPRAZOLE 40 MG/1
40 CAPSULE, DELAYED RELEASE ORAL DAILY
Qty: 90 CAPSULE | Refills: 2 | Status: SHIPPED | OUTPATIENT
Start: 2022-07-04 | End: 2022-07-25

## 2022-07-07 ENCOUNTER — CONSULT (OUTPATIENT)
Dept: OBGYN CLINIC | Facility: MEDICAL CENTER | Age: 42
End: 2022-07-07

## 2022-07-07 VITALS
BODY MASS INDEX: 30.21 KG/M2 | SYSTOLIC BLOOD PRESSURE: 120 MMHG | WEIGHT: 160 LBS | HEIGHT: 61 IN | DIASTOLIC BLOOD PRESSURE: 70 MMHG

## 2022-07-07 DIAGNOSIS — Z30.2 REQUEST FOR STERILIZATION: ICD-10-CM

## 2022-07-07 DIAGNOSIS — Z30.41 ENCOUNTER FOR SURVEILLANCE OF CONTRACEPTIVE PILLS: ICD-10-CM

## 2022-07-07 DIAGNOSIS — T83.31XD MECHANICAL BREAKDOWN OF INTRAUTERINE CONTRACEPTIVE DEVICE, SUBSEQUENT ENCOUNTER: ICD-10-CM

## 2022-07-07 DIAGNOSIS — Z01.818 PREOPERATIVE EXAMINATION: Primary | ICD-10-CM

## 2022-07-07 PROCEDURE — PREOP: Performed by: OBSTETRICS & GYNECOLOGY

## 2022-07-07 RX ORDER — NORETHINDRONE ACETATE AND ETHINYL ESTRADIOL 1MG-20(24)
1 KIT ORAL DAILY
Qty: 28 TABLET | Refills: 0 | Status: SHIPPED | OUTPATIENT
Start: 2022-07-07 | End: 2022-07-25

## 2022-07-07 NOTE — H&P (VIEW-ONLY)
History & Physical - OB/GYN   Leah Ness 36 y o  female MRN: [id number]  Unit/Bed#:  Encounter: [id number]      Chief complaint:  preop    HPI:  Ms Rubén Moura is a 36 y o  [id number] who presents for preop exam for laparoscopic bilateral salpingectomy and hysteroscopic removal of IUD portion for request for sterilization  She is without complaint today  Denies fever/chills, chest pain, shortness of breath, nausea/vomiting, or pelvic pain  She reports she needs 1 more pack OCP to last through surgery  Patient confirms again for me today that she is confident she is completed childbearing and wants permanent sterilization  We reviewed the procedure in detail  We reviewed the risks of the procedure, including but not limited to bleeding, infection, injury to bowel, bladder, neurovascular structures, or ureters, failure, ectopic pregnancy, or regret  We reviewed that that because the fallopian tubes are removed, this procedure is completely irreversible and further pregnancies would need to be achieved by IVF  We reviewed that removal of the fallopian tubes carry with it the theoretical benefit of reduced risk of ovarian cancer in the future  Discussed hysteroscopic procedure, which additionally carries the risk of uterine perforation or failure to identify/remove IUD portion  Discussed limitations of US for localization; appears intrauterine, but psb embedded deeper than expected  Reviewed same day surgery, expected recovery course, and follow up  Patient had all questions answered to her satisfaction  Informed consent was obtained  Active Problems: There is no problem list on file for this patient  PMH:  History reviewed  No pertinent past medical history      PSH:  Past Surgical History:   Procedure Laterality Date    ANKLE FRACTURE SURGERY Left      SECTION         Social Hx:  Social History     Tobacco Use    Smoking status: Never Smoker    Smokeless tobacco: Never Used   Vaping Use  Vaping Use: Never used   Substance Use Topics    Alcohol use: Never    Drug use: Never         OB Hx:  OB History    Para Term  AB Living   2 2 2 0 0 2   SAB IAB Ectopic Multiple Live Births   0 0 0 0 2      # Outcome Date GA Lbr [place]/2nd Weight Sex Delivery Anes PTL Lv   2 Term     F CS-Unspec   PREETI   1 Term     F CS-LTranv   PREETI       Meds:  Current Outpatient Medications on File Prior to Visit   Medication Sig Dispense Refill    omeprazole (PriLOSEC) 40 MG capsule TAKE 1 CAPSULE (40 MG TOTAL) BY MOUTH DAILY  90 capsule 2     No current facility-administered medications on file prior to visit  Allergies:  No Known Allergies    Physical Exam:  /70   Ht 5' 1" (1 549 m)   Wt 72 6 kg (160 lb)   BMI 30 23 kg/m²     Physical Exam  Constitutional:       General: She is not in acute distress  Appearance: Normal appearance  She is not ill-appearing, toxic-appearing or diaphoretic  Eyes:      General: No scleral icterus  Right eye: No discharge  Left eye: No discharge  Conjunctiva/sclera: Conjunctivae normal    Cardiovascular:      Rate and Rhythm: Normal rate and regular rhythm  Heart sounds: Normal heart sounds  No murmur heard  No friction rub  No gallop  Pulmonary:      Effort: Pulmonary effort is normal  No respiratory distress  Breath sounds: Normal breath sounds  No wheezing or rales  Abdominal:      General: There is no distension  Palpations: There is no mass  Tenderness: There is no abdominal tenderness  There is no guarding or rebound  Hernia: No hernia is present  Musculoskeletal:         General: No swelling  Skin:     General: Skin is warm and dry  Coloration: Skin is not jaundiced or pale  Findings: No bruising or erythema  Neurological:      Mental Status: She is alert  Psychiatric:         Mood and Affect: Mood normal          Behavior: Behavior normal          Thought Content:  Thought content normal          Judgment: Judgment normal            Assessment:   36 y o  [id number] who presents for preop exam for laparoscopic bilateral salpingectomy and hysteroscopic removal of IUD portion for request for sterilization  Plan:   1  To OR as planned  2  NPO from midnight preop  3  Surgical scrub protocol reviewed  4   Return 1-2wk postop

## 2022-07-07 NOTE — PROGRESS NOTES
History & Physical - OB/GYN   Peggy Bedolla 36 y o  female MRN: [id number]  Unit/Bed#:  Encounter: [id number]      Chief complaint:  preop    HPI:  Ms Mikki Mercedes is a 36 y o  [id number] who presents for preop exam for laparoscopic bilateral salpingectomy and hysteroscopic removal of IUD portion for request for sterilization  She is without complaint today  Denies fever/chills, chest pain, shortness of breath, nausea/vomiting, or pelvic pain  She reports she needs 1 more pack OCP to last through surgery  Patient confirms again for me today that she is confident she is completed childbearing and wants permanent sterilization  We reviewed the procedure in detail  We reviewed the risks of the procedure, including but not limited to bleeding, infection, injury to bowel, bladder, neurovascular structures, or ureters, failure, ectopic pregnancy, or regret  We reviewed that that because the fallopian tubes are removed, this procedure is completely irreversible and further pregnancies would need to be achieved by IVF  We reviewed that removal of the fallopian tubes carry with it the theoretical benefit of reduced risk of ovarian cancer in the future  Discussed hysteroscopic procedure, which additionally carries the risk of uterine perforation or failure to identify/remove IUD portion  Discussed limitations of US for localization; appears intrauterine, but psb embedded deeper than expected  Reviewed same day surgery, expected recovery course, and follow up  Patient had all questions answered to her satisfaction  Informed consent was obtained  Active Problems: There is no problem list on file for this patient  PMH:  History reviewed  No pertinent past medical history      PSH:  Past Surgical History:   Procedure Laterality Date    ANKLE FRACTURE SURGERY Left      SECTION         Social Hx:  Social History     Tobacco Use    Smoking status: Never Smoker    Smokeless tobacco: Never Used   Vaping Use  Vaping Use: Never used   Substance Use Topics    Alcohol use: Never    Drug use: Never         OB Hx:  OB History    Para Term  AB Living   2 2 2 0 0 2   SAB IAB Ectopic Multiple Live Births   0 0 0 0 2      # Outcome Date GA Lbr [place]/2nd Weight Sex Delivery Anes PTL Lv   2 Term     F CS-Unspec   PREETI   1 Term     F CS-LTranv   PREETI       Meds:  Current Outpatient Medications on File Prior to Visit   Medication Sig Dispense Refill    omeprazole (PriLOSEC) 40 MG capsule TAKE 1 CAPSULE (40 MG TOTAL) BY MOUTH DAILY  90 capsule 2     No current facility-administered medications on file prior to visit  Allergies:  No Known Allergies    Physical Exam:  /70   Ht 5' 1" (1 549 m)   Wt 72 6 kg (160 lb)   BMI 30 23 kg/m²     Physical Exam  Constitutional:       General: She is not in acute distress  Appearance: Normal appearance  She is not ill-appearing, toxic-appearing or diaphoretic  Eyes:      General: No scleral icterus  Right eye: No discharge  Left eye: No discharge  Conjunctiva/sclera: Conjunctivae normal    Cardiovascular:      Rate and Rhythm: Normal rate and regular rhythm  Heart sounds: Normal heart sounds  No murmur heard  No friction rub  No gallop  Pulmonary:      Effort: Pulmonary effort is normal  No respiratory distress  Breath sounds: Normal breath sounds  No wheezing or rales  Abdominal:      General: There is no distension  Palpations: There is no mass  Tenderness: There is no abdominal tenderness  There is no guarding or rebound  Hernia: No hernia is present  Musculoskeletal:         General: No swelling  Skin:     General: Skin is warm and dry  Coloration: Skin is not jaundiced or pale  Findings: No bruising or erythema  Neurological:      Mental Status: She is alert  Psychiatric:         Mood and Affect: Mood normal          Behavior: Behavior normal          Thought Content:  Thought content normal          Judgment: Judgment normal            Assessment:   36 y o  [id number] who presents for preop exam for laparoscopic bilateral salpingectomy and hysteroscopic removal of IUD portion for request for sterilization  Plan:   1  To OR as planned  2  NPO from midnight preop  3  Surgical scrub protocol reviewed  4   Return 1-2wk postop

## 2022-07-18 NOTE — PRE-PROCEDURE INSTRUCTIONS
Pre-Surgery Instructions:   Medication Instructions    Blisovi 24 Fe 1-20 MG-MCG(24) per tablet Take day of surgery   omeprazole (PriLOSEC) 40 MG capsule Take day of surgery  Reviewed with patient, in detail, instructions from "My Surgical Experience"  Instructed to avoid all  OTC vitamins/supplements and NSAIDS for one week prior to surgery per anesthesia guidelines  Tylenol ok to take PRN  Advised patient that Chase Mohan will call with surgery arrival time and hospital directions the business day prior to surgery  Advised patient nothing eat or drink after midnight prior to surgery  Instructed to call surgeon's office in meantime with any new illnesses/exposure  Patient verbalized understanding of current visitor restrictions/masking guidelines and advised that he/she can confirm these at time of arrival call with Chase Mohan  Patient verbalized understanding and knows to call surgeon's office with any additional questions prior to surgery

## 2022-07-21 ENCOUNTER — ANESTHESIA EVENT (OUTPATIENT)
Dept: PERIOP | Facility: HOSPITAL | Age: 42
End: 2022-07-21
Payer: COMMERCIAL

## 2022-07-25 ENCOUNTER — HOSPITAL ENCOUNTER (OUTPATIENT)
Facility: HOSPITAL | Age: 42
Setting detail: OUTPATIENT SURGERY
Discharge: HOME | End: 2022-07-25
Attending: OBSTETRICS & GYNECOLOGY | Admitting: OBSTETRICS & GYNECOLOGY
Payer: COMMERCIAL

## 2022-07-25 ENCOUNTER — ANESTHESIA (OUTPATIENT)
Dept: PERIOP | Facility: HOSPITAL | Age: 42
End: 2022-07-25
Payer: COMMERCIAL

## 2022-07-25 VITALS
BODY MASS INDEX: 29.97 KG/M2 | RESPIRATION RATE: 16 BRPM | DIASTOLIC BLOOD PRESSURE: 65 MMHG | TEMPERATURE: 98.1 F | WEIGHT: 158.73 LBS | OXYGEN SATURATION: 98 % | HEIGHT: 61 IN | SYSTOLIC BLOOD PRESSURE: 106 MMHG | HEART RATE: 59 BPM

## 2022-07-25 DIAGNOSIS — Z98.890 STATUS POST HYSTEROSCOPY: Primary | ICD-10-CM

## 2022-07-25 DIAGNOSIS — Z30.2 ENCOUNTER FOR STERILIZATION: ICD-10-CM

## 2022-07-25 DIAGNOSIS — Z90.79 STATUS POST BILATERAL SALPINGECTOMY: ICD-10-CM

## 2022-07-25 DIAGNOSIS — T83.31XD MECHANICAL BREAKDOWN OF INTRAUTERINE CONTRACEPTIVE DEVICE, SUBSEQUENT ENCOUNTER: ICD-10-CM

## 2022-07-25 PROBLEM — K21.9 GASTROESOPHAGEAL REFLUX DISEASE: Status: ACTIVE | Noted: 2022-07-25

## 2022-07-25 PROBLEM — T83.31XA MECHANICAL BREAKDOWN OF INTRAUTERINE CONTRACEPTIVE DEVICE: Status: ACTIVE | Noted: 2022-07-25

## 2022-07-25 LAB
BASOPHILS # BLD AUTO: 0.05 THOUSANDS/ΜL (ref 0–0.1)
BASOPHILS NFR BLD AUTO: 1 % (ref 0–1)
EOSINOPHIL # BLD AUTO: 0.16 THOUSAND/ΜL (ref 0–0.61)
EOSINOPHIL NFR BLD AUTO: 2 % (ref 0–6)
ERYTHROCYTE [DISTWIDTH] IN BLOOD BY AUTOMATED COUNT: 13.4 % (ref 11.6–15.1)
EXT PREGNANCY TEST URINE: NEGATIVE
EXT. CONTROL: NORMAL
HCT VFR BLD AUTO: 42.1 % (ref 34.8–46.1)
HGB BLD-MCNC: 14.1 G/DL (ref 11.5–15.4)
IMM GRANULOCYTES # BLD AUTO: 0.03 THOUSAND/UL (ref 0–0.2)
IMM GRANULOCYTES NFR BLD AUTO: 0 % (ref 0–2)
LYMPHOCYTES # BLD AUTO: 3.28 THOUSANDS/ΜL (ref 0.6–4.47)
LYMPHOCYTES NFR BLD AUTO: 33 % (ref 14–44)
MCH RBC QN AUTO: 30.1 PG (ref 26.8–34.3)
MCHC RBC AUTO-ENTMCNC: 33.5 G/DL (ref 31.4–37.4)
MCV RBC AUTO: 90 FL (ref 82–98)
MONOCYTES # BLD AUTO: 0.54 THOUSAND/ΜL (ref 0.17–1.22)
MONOCYTES NFR BLD AUTO: 6 % (ref 4–12)
NEUTROPHILS # BLD AUTO: 5.82 THOUSANDS/ΜL (ref 1.85–7.62)
NEUTS SEG NFR BLD AUTO: 58 % (ref 43–75)
NRBC BLD AUTO-RTO: 0 /100 WBCS
PLATELET # BLD AUTO: 317 THOUSANDS/UL (ref 149–390)
PMV BLD AUTO: 11.3 FL (ref 8.9–12.7)
RBC # BLD AUTO: 4.69 MILLION/UL (ref 3.81–5.12)
WBC # BLD AUTO: 9.88 THOUSAND/UL (ref 4.31–10.16)

## 2022-07-25 PROCEDURE — 58562 HYSTEROSCOPY REMOVE FB: CPT | Performed by: OBSTETRICS & GYNECOLOGY

## 2022-07-25 PROCEDURE — 58661 LAPAROSCOPY REMOVE ADNEXA: CPT | Performed by: OBSTETRICS & GYNECOLOGY

## 2022-07-25 PROCEDURE — 88302 TISSUE EXAM BY PATHOLOGIST: CPT | Performed by: PATHOLOGY

## 2022-07-25 PROCEDURE — 85025 COMPLETE CBC W/AUTO DIFF WBC: CPT | Performed by: OBSTETRICS & GYNECOLOGY

## 2022-07-25 PROCEDURE — 81025 URINE PREGNANCY TEST: CPT | Performed by: OBSTETRICS & GYNECOLOGY

## 2022-07-25 RX ORDER — ONDANSETRON 2 MG/ML
4 INJECTION INTRAMUSCULAR; INTRAVENOUS ONCE AS NEEDED
Status: DISCONTINUED | OUTPATIENT
Start: 2022-07-25 | End: 2022-07-25 | Stop reason: HOSPADM

## 2022-07-25 RX ORDER — FENTANYL CITRATE/PF 50 MCG/ML
25 SYRINGE (ML) INJECTION
Status: DISCONTINUED | OUTPATIENT
Start: 2022-07-25 | End: 2022-07-25 | Stop reason: HOSPADM

## 2022-07-25 RX ORDER — PROPOFOL 10 MG/ML
INJECTION, EMULSION INTRAVENOUS AS NEEDED
Status: DISCONTINUED | OUTPATIENT
Start: 2022-07-25 | End: 2022-07-25

## 2022-07-25 RX ORDER — OXYCODONE HYDROCHLORIDE 10 MG/1
10 TABLET ORAL EVERY 4 HOURS PRN
Status: DISCONTINUED | OUTPATIENT
Start: 2022-07-25 | End: 2022-07-25 | Stop reason: HOSPADM

## 2022-07-25 RX ORDER — LIDOCAINE HYDROCHLORIDE 20 MG/ML
INJECTION, SOLUTION EPIDURAL; INFILTRATION; INTRACAUDAL; PERINEURAL AS NEEDED
Status: DISCONTINUED | OUTPATIENT
Start: 2022-07-25 | End: 2022-07-25

## 2022-07-25 RX ORDER — GLYCOPYRROLATE 0.2 MG/ML
INJECTION INTRAMUSCULAR; INTRAVENOUS AS NEEDED
Status: DISCONTINUED | OUTPATIENT
Start: 2022-07-25 | End: 2022-07-25

## 2022-07-25 RX ORDER — OXYCODONE HYDROCHLORIDE 5 MG/1
5 TABLET ORAL EVERY 4 HOURS PRN
Status: DISCONTINUED | OUTPATIENT
Start: 2022-07-25 | End: 2022-07-25 | Stop reason: HOSPADM

## 2022-07-25 RX ORDER — DEXAMETHASONE SODIUM PHOSPHATE 10 MG/ML
INJECTION, SOLUTION INTRAMUSCULAR; INTRAVENOUS AS NEEDED
Status: DISCONTINUED | OUTPATIENT
Start: 2022-07-25 | End: 2022-07-25

## 2022-07-25 RX ORDER — OXYCODONE HYDROCHLORIDE 5 MG/1
5 TABLET ORAL EVERY 6 HOURS PRN
Qty: 10 TABLET | Refills: 0 | Status: SHIPPED | OUTPATIENT
Start: 2022-07-25 | End: 2022-09-02

## 2022-07-25 RX ORDER — FENTANYL CITRATE 50 UG/ML
INJECTION, SOLUTION INTRAMUSCULAR; INTRAVENOUS AS NEEDED
Status: DISCONTINUED | OUTPATIENT
Start: 2022-07-25 | End: 2022-07-25

## 2022-07-25 RX ORDER — SENNOSIDES 8.6 MG
650 CAPSULE ORAL EVERY 8 HOURS PRN
Qty: 50 TABLET | Refills: 1 | Status: SHIPPED | OUTPATIENT
Start: 2022-07-25

## 2022-07-25 RX ORDER — MEPERIDINE HYDROCHLORIDE 25 MG/ML
12.5 INJECTION INTRAMUSCULAR; INTRAVENOUS; SUBCUTANEOUS
Status: DISCONTINUED | OUTPATIENT
Start: 2022-07-25 | End: 2022-07-25 | Stop reason: HOSPADM

## 2022-07-25 RX ORDER — ONDANSETRON 2 MG/ML
INJECTION INTRAMUSCULAR; INTRAVENOUS AS NEEDED
Status: DISCONTINUED | OUTPATIENT
Start: 2022-07-25 | End: 2022-07-25

## 2022-07-25 RX ORDER — SODIUM CHLORIDE, SODIUM LACTATE, POTASSIUM CHLORIDE, CALCIUM CHLORIDE 600; 310; 30; 20 MG/100ML; MG/100ML; MG/100ML; MG/100ML
125 INJECTION, SOLUTION INTRAVENOUS CONTINUOUS
Status: DISCONTINUED | OUTPATIENT
Start: 2022-07-25 | End: 2022-07-25

## 2022-07-25 RX ORDER — KETOROLAC TROMETHAMINE 30 MG/ML
INJECTION, SOLUTION INTRAMUSCULAR; INTRAVENOUS AS NEEDED
Status: DISCONTINUED | OUTPATIENT
Start: 2022-07-25 | End: 2022-07-25

## 2022-07-25 RX ORDER — DEXMEDETOMIDINE HYDROCHLORIDE 100 UG/ML
INJECTION, SOLUTION INTRAVENOUS AS NEEDED
Status: DISCONTINUED | OUTPATIENT
Start: 2022-07-25 | End: 2022-07-25

## 2022-07-25 RX ORDER — ROCURONIUM BROMIDE 10 MG/ML
INJECTION, SOLUTION INTRAVENOUS AS NEEDED
Status: DISCONTINUED | OUTPATIENT
Start: 2022-07-25 | End: 2022-07-25

## 2022-07-25 RX ORDER — ACETAMINOPHEN 325 MG/1
975 TABLET ORAL EVERY 6 HOURS PRN
Status: DISCONTINUED | OUTPATIENT
Start: 2022-07-25 | End: 2022-07-25 | Stop reason: HOSPADM

## 2022-07-25 RX ORDER — HYDROMORPHONE HCL/PF 1 MG/ML
0.5 SYRINGE (ML) INJECTION
Status: DISCONTINUED | OUTPATIENT
Start: 2022-07-25 | End: 2022-07-25 | Stop reason: HOSPADM

## 2022-07-25 RX ORDER — MIDAZOLAM HYDROCHLORIDE 2 MG/2ML
INJECTION, SOLUTION INTRAMUSCULAR; INTRAVENOUS AS NEEDED
Status: DISCONTINUED | OUTPATIENT
Start: 2022-07-25 | End: 2022-07-25

## 2022-07-25 RX ORDER — BUPIVACAINE HYDROCHLORIDE 2.5 MG/ML
INJECTION, SOLUTION EPIDURAL; INFILTRATION; INTRACAUDAL AS NEEDED
Status: DISCONTINUED | OUTPATIENT
Start: 2022-07-25 | End: 2022-07-25 | Stop reason: HOSPADM

## 2022-07-25 RX ORDER — IBUPROFEN 600 MG/1
600 TABLET ORAL EVERY 6 HOURS PRN
Qty: 50 TABLET | Refills: 1 | Status: SHIPPED | OUTPATIENT
Start: 2022-07-25

## 2022-07-25 RX ADMIN — FENTANYL CITRATE 50 MCG: 50 INJECTION INTRAMUSCULAR; INTRAVENOUS at 08:07

## 2022-07-25 RX ADMIN — FENTANYL CITRATE 50 MCG: 50 INJECTION INTRAMUSCULAR; INTRAVENOUS at 08:14

## 2022-07-25 RX ADMIN — OXYCODONE HYDROCHLORIDE 5 MG: 5 TABLET ORAL at 10:16

## 2022-07-25 RX ADMIN — KETOROLAC TROMETHAMINE 30 MG: 30 INJECTION, SOLUTION INTRAMUSCULAR; INTRAVENOUS at 08:08

## 2022-07-25 RX ADMIN — DEXAMETHASONE SODIUM PHOSPHATE 10 MG: 10 INJECTION INTRAMUSCULAR; INTRAVENOUS at 07:45

## 2022-07-25 RX ADMIN — GLYCOPYRROLATE 0.2 MG: 0.2 INJECTION, SOLUTION INTRAMUSCULAR; INTRAVENOUS at 07:27

## 2022-07-25 RX ADMIN — LIDOCAINE HYDROCHLORIDE 60 MG: 20 INJECTION, SOLUTION EPIDURAL; INFILTRATION; INTRACAUDAL; PERINEURAL at 07:37

## 2022-07-25 RX ADMIN — DEXMEDETOMIDINE HCL 20 MCG: 100 INJECTION INTRAVENOUS at 07:57

## 2022-07-25 RX ADMIN — FENTANYL CITRATE 100 MCG: 50 INJECTION INTRAMUSCULAR; INTRAVENOUS at 07:37

## 2022-07-25 RX ADMIN — SUGAMMADEX 200 MG: 100 INJECTION, SOLUTION INTRAVENOUS at 08:27

## 2022-07-25 RX ADMIN — ROCURONIUM BROMIDE 50 MG: 50 INJECTION, SOLUTION INTRAVENOUS at 07:38

## 2022-07-25 RX ADMIN — SODIUM CHLORIDE, SODIUM LACTATE, POTASSIUM CHLORIDE, AND CALCIUM CHLORIDE 125 ML/HR: .6; .31; .03; .02 INJECTION, SOLUTION INTRAVENOUS at 05:56

## 2022-07-25 RX ADMIN — MIDAZOLAM 2 MG: 1 INJECTION INTRAMUSCULAR; INTRAVENOUS at 07:27

## 2022-07-25 RX ADMIN — ONDANSETRON 4 MG: 2 INJECTION INTRAMUSCULAR; INTRAVENOUS at 07:45

## 2022-07-25 RX ADMIN — PROPOFOL 200 MG: 10 INJECTION, EMULSION INTRAVENOUS at 07:37

## 2022-07-25 NOTE — ANESTHESIA PREPROCEDURE EVALUATION
Procedure:  HYSTEROSCOPY (N/A Uterus)  Hysteroscopic removal of IUD portion (N/A Uterus)  SALPINGECTOMY, LAPAROSCOPIC (Bilateral Uterus)    Relevant Problems   GI/HEPATIC   (+) Gastroesophageal reflux disease        Physical Exam    Airway    Mallampati score: II  TM Distance: >3 FB  Neck ROM: full     Dental       Cardiovascular  Rhythm: regular, Rate: normal, Cardiovascular exam normal    Pulmonary  Pulmonary exam normal Breath sounds clear to auscultation,     Other Findings        Anesthesia Plan  ASA Score- 2     Anesthesia Type- general with ASA Monitors  Additional Monitors:   Airway Plan: ETT  Plan Factors-Exercise tolerance (METS): >4 METS  Chart reviewed  Existing labs reviewed  Patient is not a current smoker  Obstructive sleep apnea risk education given perioperatively  Induction- intravenous  Postoperative Plan-     Informed Consent- Anesthetic plan and risks discussed with patient

## 2022-07-25 NOTE — ANESTHESIA POSTPROCEDURE EVALUATION
Post-Op Assessment Note    CV Status:  Stable  Pain Score: 1    Pain management: adequate     Mental Status:  Alert and awake   Hydration Status:  Euvolemic   PONV Controlled:  Controlled   Airway Patency:  Patent      Post Op Vitals Reviewed: Yes      Staff: Anesthesiologist         No complications documented      BP      Temp      Pulse     Resp      SpO2      /65   Pulse 59   Temp 98 1 °F (36 7 °C) (Temporal)   Resp 16   Ht 5' 1" (1 549 m)   Wt 72 kg (158 lb 11 7 oz)   SpO2 98%   BMI 29 99 kg/m²

## 2022-07-25 NOTE — OP NOTE
OPERATIVE REPORT  PATIENT NAME: Chris Solomon    :  1980  MRN: [id number]  Pt Location: [facility]    SURGERY DATE: 2022    Surgeon(s) and Role:     * Braulio Beal MD - Primary     * Shahab Foreman MD - Assisting    Preop Diagnosis:  Encounter for sterilization [Z30 2]  Mechanical breakdown of intrauterine contraceptive device, subsequent encounter [T83 31XD]    Post-Op Diagnosis Codes:     * Encounter for sterilization [Z30 2]     * Mechanical breakdown of intrauterine contraceptive device, subsequent encounter [T83 31XD]    Procedure(s) (LRB):  HYSTEROSCOPY (N/A)  Hysteroscopic removal of IUD portion (N/A)  SALPINGECTOMY, LAPAROSCOPIC (Bilateral)    Specimen(s):  ID Type Source Tests Collected by Time Destination   1 : BILATERAL FALLOPIAN TUBES Tissue Fallopian Tubes, Bilateral TISSUE EXAM Braulio Beal MD 2022 0804        Estimated Blood Loss:   10 mL    Drains:  NG/OG/Enteral Tube Orogastric 18 Fr Right mouth (Active)   Number of days: 0       Anesthesia Type:   General    Operative Indications:  Encounter for sterilization [Z30 2]  Mechanical breakdown of intrauterine contraceptive device, subsequent encounter [T83 31XD]      Operative Findings:    Normal external female genitalia- no evidence of lesions or lacerations   Bimanual exam revealed an anteverted uterus, normal size and contour, fixed  No adnexal masses appreciated  Speculum exam revealed a rectocele with normal vagina  Cervix very anterior  No evidence of bleeding, lesions or lacerations  Uterus sounded to 8cm  Laparoscopic exam revealed normal bowel, bladder, vasculature and liver edge  Omental adhesion to the anterior abdominal wall noted in the left lower quadrant  There was no evidence of bowel injury under the umbilical port with trocar entry  On inspection of the pelvis the anterior uterus was completely adherent to the anterior abdominal wall   Left fallopian tube adherent to the left ovary and removed in pieces  Left ovary grossly normal  Right fallopian tube and ovary grossly normal in appearance  Excellent hemostasis at surgical sites     On hysteroscopic exam, the IUD was noted in the MAHAMED, removed intact with fragment missing consistent with previous ultrasound  Bilateral ostia visualized  Uterine septum noted  Otherwise, cavity grossly normal in appearance  Fluid deficit 100mL   Urine 580 mL       Complications:   None    Procedure and Technique:    Brief History  Patient is a 37 yo  with a history of  delivery x2 who presented for sterilization and IUD removal  She was consented in the office for salpingectomy  She has a Paragard IUD in place noted to be fragmented on ultrasound  She was consented for removal via hysteroscopy  Description of Procedure    Patient was taken to the operating room were a time out was performed to confirm correct patient and correct procedure  General endotracheal anesthesia (GET) was administered and the patient was positioned on the OR table in the dorsal lithotomy position  All pressure points were padded and a ijeoma hugger was placed to maintain control of core body temperature  A bimanual exam was performed and the uterus was noted to be anteverted, normal in size and consistency, not freely mobile with no palpable adnexal masses or fullness  The patient was prepped and draped in the usual sterile fashion with chloroprep on the abdomen and diluted chlohexidine on the vagina and perineum  Operative Technique    A straight catheter was introduced into the bladder, which was drained of 150cc of clear yellow urine  A sponge stick was placed on the vagina for manipulation  Sterile gloves were then exchanged and attention was turned to the abdomen  0 25% Marcaine was infiltrated into the base of the umbilicus  A 5mm incision was made at the inferior edge of the umbilicus for introduction of a 5mm trocar  Trocar was introduced under direct visualization  Pneumoperitoneum was then established to a maximum of 15mmHg  The entire abdomen and pelvis was inspected and there was no evidence of injury to bowel, bladder, vasculature, or other structures  Attention was then turned to the pelvis  Patient was placed in Trendelenburg  On inspection of the pelvis the anterior uterus was completely adherent to the anterior abdominal wall  Left fallopian tube adherent to the left ovary and removed in pieces  Left ovary grossly normal  Right fallopian tube and ovary grossly normal in appearance  Two additional port sites were selected in the left and right lower abdomen approximately 2cm superior and medial to the iliac crests  Area was infiltrated with 0 25% Marcaine  Then a 5mm incision was made for introduction of a 5mm trocar under direct visualization at each site  A Maryland grasper was inserted through this port and used to visualize the fimbriated ends of the tubes  The left fallopian tube was grasped at its fimbriated end with a [place] grasper and elevated to visualize the mesosalpinx  The tube was adherent to the ovary and the adhesions were taken down with the Enseal to separate the tube and the ovary  Fragment of the tube was removed and sent for pathology  The remaining portion of the tube was removed from the mesosalpinx working proximally  Approximately 2cm from the cornua, Enseal was used to amputate fallopian tube  This was then withdrawn from the abdominal cavity and sent for pathology  Attention was then turned to the contralateral tube, which was amputated in similar fashion  Good hemostasis was confirmed following salpingectomy  The inferior trocars were removed and the laparoscope was withdrawn from the abdomen, followed by its trocar sleeve at the umbilicus  Skin incisions were closed with subcuticular stitches of 4-0 monocryl  Attention was turned to the vagina  Bivalved speculum was reinserted into the vagina   The anterior lip of the cervix was grasped with single tooth tenaculum  Uterus was sounded to 8cm  Cervical canal was dilated to accommodate at 6mm hysteroscope  The hysteroscope was inserted into the cervix and the Paragard was visualized in the lower uterine segment  Using hysteroscopic grasper, the Paragard was grasped and removed intact  The hysteroscope was re-inserted and the cavity was inspected bilateral ostia visualized uterine septum noted  Cavity grossly normal in appearance  Hysteroscope was removed from the uterus  Tenaculum was removed from the anterior lip of the cervix and sites were hemostatic  Speculum was removed from the vagina  At the conclusion of the procedure, all needle, sponge, and instrument counts were noted to be correct x2  Patient tolerated the procedure well and was transferred to PACU in stable condition prior to discharge with follow up in 1-2 weeks  Dr Arely Cline was present and participated in all key portions of the case    Patient Disposition:  PACU  and extubated and stable      SIGNATURE: Roney Self MD  DATE: July 25, 2022  TIME: 8:47 AM

## 2022-07-25 NOTE — INTERVAL H&P NOTE
H&P reviewed  After examining the patient I find no changes in the patients condition since the H&P had been written      Vitals:    07/25/22 0537   BP: 126/88   Pulse: 76   Resp: 20   Temp: 97 7 °F (36 5 °C)   SpO2: 96%

## 2022-08-08 ENCOUNTER — OFFICE VISIT (OUTPATIENT)
Dept: OBGYN CLINIC | Facility: MEDICAL CENTER | Age: 42
End: 2022-08-08

## 2022-08-08 VITALS
SYSTOLIC BLOOD PRESSURE: 122 MMHG | DIASTOLIC BLOOD PRESSURE: 78 MMHG | HEIGHT: 61 IN | WEIGHT: 160 LBS | BODY MASS INDEX: 30.21 KG/M2

## 2022-08-08 DIAGNOSIS — Z09 POSTOPERATIVE EXAMINATION: Primary | ICD-10-CM

## 2022-08-08 DIAGNOSIS — Z98.890 STATUS POST HYSTEROSCOPY: ICD-10-CM

## 2022-08-08 DIAGNOSIS — Z90.79 STATUS POST BILATERAL SALPINGECTOMY: ICD-10-CM

## 2022-08-08 PROCEDURE — 99024 POSTOP FOLLOW-UP VISIT: CPT | Performed by: OBSTETRICS & GYNECOLOGY

## 2022-08-08 NOTE — PROGRESS NOTES
Assessment:  36 y o  [id number] who is POD#2wks from laparoscopic bilateral salpingectomy and hysteroscopic removal of IUD  Plan:  Diagnoses and all orders for this visit:    Postoperative examination  Status post hysteroscopy w/ IUD removal  Status post bilateral salpingectomy  - Routine postop care  - Surgical pathology and intraop findings reviewed      __________________________________________________________________    Subjective   Michelle Bedolla is a 36 y o  [id number] who presents POD#2wks from laparoscopic bilateral salpingectomy and hysteroscopic removal of IUD  Patient reports pain in RLQ, mild just moreso than any other incision  She notes her pain is overall well-controlled  She is using ibuprofen/tynelon to control her pain  Her incision is healing well; she denies redness or drainage  Her bowel function is normal and she is having regular BM's  Urinating slightly more frequently than baseline  No dysuria  She has  returned to most of her normal activities and would like to resume exercise  She has not yet returned to sexual activity  Objective  /78   Ht 5' 1" (1 549 m)   Wt 72 6 kg (160 lb)   BMI 30 23 kg/m²      Physical Exam:  Physical Exam  Constitutional:       General: She is not in acute distress  Appearance: Normal appearance  She is not ill-appearing, toxic-appearing or diaphoretic  Eyes:      General: No scleral icterus  Right eye: No discharge  Left eye: No discharge  Conjunctiva/sclera: Conjunctivae normal    Cardiovascular:      Rate and Rhythm: Normal rate  Pulmonary:      Effort: Pulmonary effort is normal  No respiratory distress  Abdominal:      General: There is no distension  Palpations: There is no mass  Tenderness: There is no abdominal tenderness  There is no guarding or rebound  Hernia: No hernia is present  Comments: Incisions clean, dry, and intact   No erythema or drainage   Musculoskeletal:         General: No swelling  Skin:     General: Skin is warm and dry  Coloration: Skin is not jaundiced or pale  Findings: No bruising or erythema  Neurological:      Mental Status: She is alert  Psychiatric:         Mood and Affect: Mood normal          Behavior: Behavior normal          Thought Content: Thought content normal          Judgment: Judgment normal            Surgical Pathology  "A   Fallopian tubes, bilateral (tubal ligation):  - Complete cross-sections of bilateral fallopian tubes "

## 2022-08-15 RX ORDER — SODIUM CHLORIDE 9 MG/ML
125 INJECTION, SOLUTION INTRAVENOUS CONTINUOUS
Status: CANCELLED | OUTPATIENT
Start: 2022-08-15

## 2022-08-16 ENCOUNTER — HOSPITAL ENCOUNTER (OUTPATIENT)
Dept: GASTROENTEROLOGY | Facility: MEDICAL CENTER | Age: 42
Setting detail: OUTPATIENT SURGERY
Discharge: HOME | End: 2022-08-16
Admitting: INTERNAL MEDICINE
Payer: COMMERCIAL

## 2022-08-16 ENCOUNTER — ANESTHESIA EVENT (OUTPATIENT)
Dept: GASTROENTEROLOGY | Facility: MEDICAL CENTER | Age: 42
End: 2022-08-16

## 2022-08-16 ENCOUNTER — ANESTHESIA (OUTPATIENT)
Dept: GASTROENTEROLOGY | Facility: MEDICAL CENTER | Age: 42
End: 2022-08-16

## 2022-08-16 VITALS
WEIGHT: 160 LBS | RESPIRATION RATE: 16 BRPM | DIASTOLIC BLOOD PRESSURE: 82 MMHG | OXYGEN SATURATION: 98 % | BODY MASS INDEX: 30.21 KG/M2 | TEMPERATURE: 97.5 F | SYSTOLIC BLOOD PRESSURE: 121 MMHG | HEART RATE: 91 BPM | HEIGHT: 61 IN

## 2022-08-16 DIAGNOSIS — R10.13 EPIGASTRIC PAIN: ICD-10-CM

## 2022-08-16 DIAGNOSIS — K21.9 GASTROESOPHAGEAL REFLUX DISEASE, UNSPECIFIED WHETHER ESOPHAGITIS PRESENT: ICD-10-CM

## 2022-08-16 PROCEDURE — 43239 EGD BIOPSY SINGLE/MULTIPLE: CPT | Performed by: INTERNAL MEDICINE

## 2022-08-16 PROCEDURE — 88305 TISSUE EXAM BY PATHOLOGIST: CPT | Performed by: PATHOLOGY

## 2022-08-16 RX ORDER — LIDOCAINE HYDROCHLORIDE 20 MG/ML
INJECTION, SOLUTION EPIDURAL; INFILTRATION; INTRACAUDAL; PERINEURAL AS NEEDED
Status: DISCONTINUED | OUTPATIENT
Start: 2022-08-16 | End: 2022-08-16

## 2022-08-16 RX ORDER — PROPOFOL 10 MG/ML
INJECTION, EMULSION INTRAVENOUS CONTINUOUS PRN
Status: DISCONTINUED | OUTPATIENT
Start: 2022-08-16 | End: 2022-08-16

## 2022-08-16 RX ORDER — PROPOFOL 10 MG/ML
INJECTION, EMULSION INTRAVENOUS AS NEEDED
Status: DISCONTINUED | OUTPATIENT
Start: 2022-08-16 | End: 2022-08-16

## 2022-08-16 RX ORDER — SODIUM CHLORIDE 9 MG/ML
125 INJECTION, SOLUTION INTRAVENOUS CONTINUOUS
Status: DISCONTINUED | OUTPATIENT
Start: 2022-08-16 | End: 2022-08-20 | Stop reason: HOSPADM

## 2022-08-16 RX ADMIN — PROPOFOL 130 MCG/KG/MIN: 10 INJECTION, EMULSION INTRAVENOUS at 14:59

## 2022-08-16 RX ADMIN — LIDOCAINE HYDROCHLORIDE 100 MG: 20 INJECTION, SOLUTION EPIDURAL; INFILTRATION; INTRACAUDAL at 14:59

## 2022-08-16 RX ADMIN — PROPOFOL 140 MG: 10 INJECTION, EMULSION INTRAVENOUS at 14:59

## 2022-08-16 RX ADMIN — SODIUM CHLORIDE 125 ML/HR: 0.9 INJECTION, SOLUTION INTRAVENOUS at 14:53

## 2022-08-16 NOTE — H&P
History and Physical - [facility]  Leah Ness 36 y o  female MRN: [id number]                  HPI: Leah Ness is a 36y o  year old female who presents for reflux and epigastric pain  REVIEW OF SYSTEMS: Per the HPI, and otherwise unremarkable      Historical Information   Past Medical History:   Diagnosis Date    GERD (gastroesophageal reflux disease)      Past Surgical History:   Procedure Laterality Date    ANKLE FRACTURE SURGERY Left      SECTION      NV HYSTEROSCOPY,DX,SEP PROC N/A 2022    Procedure: HYSTEROSCOPY;  Surgeon: Jesús Ramon MD;  [facility];  Service: Gynecology    NV LAP,RMV  ADNEXAL STRUCTURE Bilateral 2022    Procedure: SALPINGECTOMY, LAPAROSCOPIC;  Surgeon: Jesús Ramon MD;  [facility];  Service: Gynecology    NV REMOVE INTRAUTERINE DEVICE N/A 2022    Procedure: Hysteroscopic removal of IUD portion;  Surgeon: Jesús Ramon MD;  [facility];  Service: Gynecology     Social History   Social History     Substance and Sexual Activity   Alcohol Use Never     Social History     Substance and Sexual Activity   Drug Use Never     Social History     Tobacco Use   Smoking Status Never Smoker   Smokeless Tobacco Never Used     Family History   Problem Relation Age of Onset    Diabetes Mother     Diabetes Father     Heart disease Father     Diabetes Sister     Diabetes Brother     No Known Problems Daughter     No Known Problems Maternal Grandmother     No Known Problems Maternal Grandfather     No Known Problems Paternal Grandmother     No Known Problems Paternal Grandfather     No Known Problems Sister     No Known Problems Daughter        Meds/Allergies       Current Outpatient Medications:     acetaminophen (TYLENOL) 650 mg CR tablet    ibuprofen (MOTRIN) 600 mg tablet    oxyCODONE (Roxicodone) 5 immediate release tablet    Current Facility-Administered Medications:     sodium chloride 0 9 % infusion, 125 mL/hr, Intravenous, Continuous    No Known Allergies    Objective     /74   Pulse 73   Temp 97 5 °F (36 4 °C) (Temporal)   Resp 16   Ht 5' 1" (1 549 m)   Wt 72 6 kg (160 lb)   SpO2 100%   BMI 30 23 kg/m²       PHYSICAL EXAM    Gen: NAD  Head: NCAT  CV: RRR  CHEST: Clear  ABD: soft, NT/ND  EXT: no edema      ASSESSMENT/PLAN:  This is a 36y o  year old female here for upper endoscopy, and she is stable and optimized for her procedure

## 2022-08-16 NOTE — ANESTHESIA POSTPROCEDURE EVALUATION
Post-Op Assessment Note    CV Status:  Stable    Pain management: adequate     Mental Status:  Alert and awake   Hydration Status:  Euvolemic   PONV Controlled:  Controlled   Airway Patency:  Patent      Post Op Vitals Reviewed: Yes      Staff: Anesthesiologist         No complications documented      /71 (08/16/22 1510)    Temp      Pulse 83 (08/16/22 1510)   Resp 16 (08/16/22 1510)    SpO2 94 % (08/16/22 1510)

## 2022-08-16 NOTE — ANESTHESIA PREPROCEDURE EVALUATION
Procedure:  EGD    Relevant Problems   GI/HEPATIC   (+) Gastroesophageal reflux disease        Physical Exam    Airway    Mallampati score: I  TM Distance: >3 FB  Neck ROM: full     Dental       Cardiovascular  Rhythm: regular, Rate: normal, Cardiovascular exam normal    Pulmonary  Pulmonary exam normal     Other Findings        Anesthesia Plan  ASA Score- 2     Anesthesia Type- IV sedation with anesthesia with ASA Monitors  Additional Monitors:   Airway Plan:           Plan Factors-Exercise tolerance (METS): >4 METS  Chart reviewed  Existing labs reviewed  Patient summary reviewed  Patient is not a current smoker  Patient did not smoke on day of surgery  Obstructive sleep apnea risk education given perioperatively  Induction- intravenous  Postoperative Plan-     Informed Consent- Anesthetic plan and risks discussed with patient

## 2022-08-28 NOTE — RESULT ENCOUNTER NOTE
The results were negative (unremarkable)  My medical assistant will call her to let her know the results

## 2022-09-02 ENCOUNTER — OFFICE VISIT (OUTPATIENT)
Dept: GASTROENTEROLOGY | Facility: MEDICAL CENTER | Age: 42
End: 2022-09-02
Payer: COMMERCIAL

## 2022-09-02 VITALS
BODY MASS INDEX: 30.58 KG/M2 | SYSTOLIC BLOOD PRESSURE: 124 MMHG | WEIGHT: 162 LBS | HEART RATE: 72 BPM | OXYGEN SATURATION: 98 % | DIASTOLIC BLOOD PRESSURE: 76 MMHG | HEIGHT: 61 IN

## 2022-09-02 DIAGNOSIS — K59.00 CONSTIPATION, UNSPECIFIED CONSTIPATION TYPE: ICD-10-CM

## 2022-09-02 DIAGNOSIS — K31.A0 GASTRIC INTESTINAL METAPLASIA: Primary | ICD-10-CM

## 2022-09-02 DIAGNOSIS — K21.9 GASTROESOPHAGEAL REFLUX DISEASE WITHOUT ESOPHAGITIS: ICD-10-CM

## 2022-09-02 DIAGNOSIS — R10.13 EPIGASTRIC PAIN: ICD-10-CM

## 2022-09-02 PROCEDURE — 99214 OFFICE O/P EST MOD 30 MIN: CPT | Performed by: INTERNAL MEDICINE

## 2022-09-02 RX ORDER — OMEPRAZOLE 20 MG/1
20 CAPSULE, DELAYED RELEASE ORAL DAILY
COMMUNITY
End: 2022-09-02 | Stop reason: SDUPTHER

## 2022-09-02 RX ORDER — OMEPRAZOLE 20 MG/1
20 CAPSULE, DELAYED RELEASE ORAL DAILY
Qty: 30 CAPSULE | Refills: 3 | Status: SHIPPED | OUTPATIENT
Start: 2022-09-02 | End: 2022-10-17

## 2022-09-02 NOTE — PROGRESS NOTES
[facility] - Outpatient Follow-up Note  Guicho Warner 36 y o  female MRN: [id number]  Encounter: [id number]          ASSESSMENT AND PLAN:  50-year-old with no significant past medical history presents for evaluation  1  Epigastric pain  2  Gastric intestinal metaplasia  3  Gastroesophageal reflux disease without esophagitis  She was previously evaluated for chronic epigastric abdominal pain  She underwent EGD showing small hiatal hernia otherwise normal endoscopic exam   Biopsy showed focal intestinal metaplasia  I discussed that intestinal metaplasia is technically a precancerous change the lining of the stomach and I recommend repeating EGD in 1 year to obtain mapping biopsies  She had some improvement on daily PPI and I recommend that she continue this to see if some or symptoms may be related to gastroesophageal reflux  She continues to have intermittent epigastric pain particularly after eating  She was previously ordered right upper quadrant ultrasound however this was not yet performed  I discussed importance of obtaining the ultrasound and this shows cholelithiasis or biliary sludge consider referral to general surgery to discuss cholecystectomy    - omeprazole (PriLOSEC) 20 mg delayed release capsule; Take 1 capsule (20 mg total) by mouth daily  Dispense: 30 capsule; Refill: 3    4  Constipation, unspecified constipation type  She has history of intermittent constipation with straining  I discussed constipation management with her today including high-fiber diet, fiber supplementation and adequate hydration  Recommended she start fiber supplementation with goal of 25 g per day  She has no indication for colonoscopy at this time  - psyllium (METAMUCIL) 58 6 % powder; Take 1 packet by mouth 3 (three) times a day  Dispense: 30 packet;  Refill: 3      Follow up in 4 months  ______________________________________________________________________    SUBJECTIVE: 65-year-old with no significant past medical history presents for evaluation  She was seen in the GI office 2022  At that time she had history of chronic epigastric discomfort, nausea and epigastric burning sensation  She was recommended to start omeprazole and increased to 40 mg daily  She was she was ordered for right upper quadrant ultrasound which was not performed  Interval history:  She underwent EGD 2022 showing a 2 cm hiatal hernia otherwise normal exam   Gastric biopsy showed chronic inactive gastritis with focal intestinal metaplasia, duodenal biopsies were normal      She continues to take omeprazole 20 mg a day  She reports intermittent episodes of persistent epigastric discomfort which can usually last a few hours  This can occur after eating sometimes with heavy foods  She reports intermittent constipation with straining and has a bowel movement usually every 2 days  She reports a palpable bulge in her rectum when she does have straining and harder stools  She denies rectal bleeding        REVIEW OF SYSTEMS IS OTHERWISE NEGATIVE    Ten point review of systems is negative other than stated as per HPI    Historical Information   Past Medical History:   Diagnosis Date    GERD (gastroesophageal reflux disease)      Past Surgical History:   Procedure Laterality Date    ANKLE FRACTURE SURGERY Left      SECTION      [place] HYSTEROSCOPY,DX,SEP  [address] N/A 2022    Procedure: HYSTEROSCOPY;  Surgeon: Josue Mera MD;  [facility];  Service: Gynecology    CO LAP,RMV  ADNEXAL STRUCTURE Bilateral 2022    Procedure: SALPINGECTOMY, LAPAROSCOPIC;  Surgeon: Josue Mera MD;  [facility];  Service: Gynecology    CO REMOVE INTRAUTERINE DEVICE N/A 2022    Procedure: Hysteroscopic removal of IUD portion;  Surgeon: Josue Mera MD;  [facility];  Service: Gynecology     Social History   Social History     Substance and Sexual Activity   Alcohol Use Never     Social History     Substance and Sexual Activity   Drug Use Never     Social History     Tobacco Use   Smoking Status Never Smoker   Smokeless Tobacco Never Used     Family History   Problem Relation Age of Onset    Diabetes Mother     Diabetes Father     Heart disease Father     Diabetes Sister     Diabetes Brother     No Known Problems Daughter     No Known Problems Maternal Grandmother     No Known Problems Maternal Grandfather     No Known Problems Paternal Grandmother     No Known Problems Paternal Grandfather     No Known Problems Sister     No Known Problems Daughter        Meds/Allergies       Current Outpatient Medications:     acetaminophen (TYLENOL) 650 mg CR tablet    ibuprofen (MOTRIN) 600 mg tablet    oxyCODONE (Roxicodone) 5 immediate release tablet    No Known Allergies        Objective     Blood pressure 124/76, pulse 72, height 5' 1" (1 549 m), weight 73 5 kg (162 lb), SpO2 98 %, not currently breastfeeding  Body mass index is 30 61 kg/m²  PHYSICAL EXAM:      General Appearance:   Alert, cooperative, no distress   HEENT:   Normocephalic, atraumatic, anicteric  Neck:  Supple, symmetrical, trachea midline   Lungs:   Clear to auscultation bilaterally; no rales, rhonchi or wheezing; respirations unlabored    Heart[de-identified]   Regular rate and rhythm; no murmur, rub, or gallop  Abdomen:   Soft, mild epigastric tenderness to deep palpation without rebound or guarding, non-distended; normal bowel sounds; no masses, no organomegaly    Genitalia:   Deferred    Rectal:   Deferred    Extremities:  No cyanosis, clubbing or edema    Pulses:  2+ and symmetric    Skin:  No jaundice, rashes, or lesions    Lymph nodes:  No palpable cervical lymphadenopathy        Lab Results:   No visits with results within 1 Day(s) from this visit     Latest known visit with results is:   Hospital Outpatient Visit on 08/16/2022   Component Date Value    Case Report 08/16/2022 Value:Surgical Pathology Report                         Case: [id number]                                   Authorizing Provider:  Vicky Jaarmillo MD           Collected:           08/16/2022 1501              Ordering Location:     [facility]        Received:            08/16/2022 [address]  [facility]                                                     Pathologist:           Pearl Parr MD                                                                Specimens:   A) - Duodenum, bx's r/o celiac                                                                      B) - Stomach, bx's r/o H pylori                                                            Final Diagnosis 08/16/2022                      Value: This result contains rich text formatting which cannot be displayed here   Additional Information 08/16/2022                      Value: This result contains rich text formatting which cannot be displayed here  Miladis Cruz Gross Description 08/16/2022                      Value: This result contains rich text formatting which cannot be displayed here  Radiology Results:   EGD    Result Date: 8/16/2022  Narrative: [id number] [address] [place] [address] [phone] DATE OF SERVICE: 8/16/22 PHYSICIAN(S): Attending: Garland Blanco MD Fellow: Vicky Jaramillo MD INDICATION: Epigastric pain, Gastroesophageal reflux disease, unspecified whether esophagitis present POST-OP DIAGNOSIS: See the impression below  PREPROCEDURE: Informed consent was obtained for the procedure, including sedation  Risks of perforation, hemorrhage, adverse drug reaction and aspiration were discussed  The patient was placed in the left lateral decubitus position  Patient was explained about the risks and benefits of the procedure  Risks including but not limited to bleeding, infection, and perforation were explained in detail   Also explained about less than 100% sensitivity with the exam and other alternatives  DETAILS OF PROCEDURE: Patient was taken to the procedure room where a time out was performed to confirm correct patient and correct procedure  The patient underwent monitored anesthesia care, which was administered by an anesthesia professional  The patient's blood pressure, heart rate, level of consciousness, respirations and oxygen were monitored throughout the procedure  The scope was advanced to the second part of the duodenum  Retroflexion was performed in the fundus  The patient experienced no blood loss  The procedure was not difficult  The patient tolerated the procedure well  There were no apparent complications  ANESTHESIA INFORMATION: ASA: II Anesthesia Type: IV Sedation with Anesthesia MEDICATIONS: sodium chloride 0 9 % infusion 300 mL*  *From user-documented volume (Totals for administrations occurring from 1456 to 1508 on 08/16/22) FINDINGS: The esophagus appeared normal  2 cm sliding hiatal hernia (type I hiatal hernia) without Mardel German lesions present - GE junction 34 cm from the incisors, diaphragmatic impression 36 cm from the incisors The 1st part of the duodenum and 2nd part of the duodenum appeared normal  Performed multiple forceps biopsies in the body of the stomach, incisura and antrum  Rule out H  Pylori  Performed multiple forceps biopsies in the 1st part of the duodenum and 2nd part of the duodenum  Rule out Celiac disease  SPECIMENS: ID Type Source Tests Collected by Time Destination 1 : bx's r/o celiac Tissue Duodenum TISSUE EXAM Jay Crawford MD 8/16/2022 1501  2 : bx's r/o H pylori Tissue Stomach TISSUE EXAM Jay Crawford MD 8/16/2022 1506      Impression: Normal esophagus and duodenum  2 cm hiatal hernia  Biopsies taken for H pylori and celiac disease as above   RECOMMENDATION: Follow-up biopsies Follow-up outpatient  ATTENDING ATTESTATION:  I was present throughout the entire procedure from insertion to complete withdrawal of the scope  I performed all interventions myself or oversaw the fellow     Maryuri Preciado MD

## 2022-10-15 ENCOUNTER — HOSPITAL ENCOUNTER (OUTPATIENT)
Dept: ULTRASOUND IMAGING | Facility: HOSPITAL | Age: 42
Discharge: HOME | End: 2022-10-15
Attending: INTERNAL MEDICINE
Payer: COMMERCIAL

## 2022-10-15 DIAGNOSIS — R10.13 EPIGASTRIC PAIN: ICD-10-CM

## 2022-10-15 PROCEDURE — 76705 ECHO EXAM OF ABDOMEN: CPT

## 2022-10-17 DIAGNOSIS — K21.9 GASTROESOPHAGEAL REFLUX DISEASE WITHOUT ESOPHAGITIS: ICD-10-CM

## 2022-10-17 RX ORDER — OMEPRAZOLE 20 MG/1
20 CAPSULE, DELAYED RELEASE ORAL DAILY
Qty: 90 CAPSULE | Refills: 3 | Status: SHIPPED | OUTPATIENT
Start: 2022-10-17 | End: 2023-10-17

## 2022-10-20 ENCOUNTER — TELEPHONE (OUTPATIENT)
Dept: GASTROENTEROLOGY | Facility: CLINIC | Age: 42
End: 2022-10-20

## 2022-10-20 DIAGNOSIS — K76.9 LIVER LESION: Primary | ICD-10-CM

## 2022-10-20 DIAGNOSIS — K80.20 SYMPTOMATIC CHOLELITHIASIS: Primary | ICD-10-CM

## 2022-10-20 NOTE — TELEPHONE ENCOUNTER
Patients GI provider:  Dr Marie Parent    Number to return call: [phone]    Reason for call: Maria Luisa York from radiology calling with immediate findings in [address] right upper quadrant  Ready to read in Emiliano  Brian Text sent       Scheduled procedure/appointment date if applicable: N/A

## 2022-10-20 NOTE — RESULT ENCOUNTER NOTE
My medical assistant will call patient with results  Please let the patient know that the ultrasound shows gallstones  This may be causing her abdominal pain as we discussed in the office  I have placed a referral for general surgery to discuss cholecystectomy since her EGD was normal    There was also a small lesion in the liver which is likely benign ( non-cancerous)  I have ordered an mri for evaluation to take a better look at it   Please ask her to schedule the MRI

## 2022-12-19 DIAGNOSIS — K21.9 GASTROESOPHAGEAL REFLUX DISEASE WITHOUT ESOPHAGITIS: ICD-10-CM

## 2022-12-19 RX ORDER — OMEPRAZOLE 20 MG/1
CAPSULE, DELAYED RELEASE ORAL
Qty: 90 CAPSULE | Refills: 4 | Status: SHIPPED | OUTPATIENT
Start: 2022-12-19

## 2023-02-16 DIAGNOSIS — K21.9 GASTROESOPHAGEAL REFLUX DISEASE WITHOUT ESOPHAGITIS: ICD-10-CM

## 2023-02-16 RX ORDER — OMEPRAZOLE 20 MG/1
CAPSULE, DELAYED RELEASE ORAL
Qty: 90 CAPSULE | Refills: 5 | Status: SHIPPED | OUTPATIENT
Start: 2023-02-16

## 2023-03-07 ENCOUNTER — OFFICE VISIT (OUTPATIENT)
Dept: GASTROENTEROLOGY | Facility: MEDICAL CENTER | Age: 43
End: 2023-03-07

## 2023-03-07 VITALS
SYSTOLIC BLOOD PRESSURE: 111 MMHG | DIASTOLIC BLOOD PRESSURE: 76 MMHG | TEMPERATURE: 98 F | WEIGHT: 167.2 LBS | BODY MASS INDEX: 31.59 KG/M2 | HEART RATE: 78 BPM

## 2023-03-07 DIAGNOSIS — K21.9 GASTROESOPHAGEAL REFLUX DISEASE WITHOUT ESOPHAGITIS: ICD-10-CM

## 2023-03-07 DIAGNOSIS — K31.A0 GASTRIC INTESTINAL METAPLASIA: ICD-10-CM

## 2023-03-07 DIAGNOSIS — K80.20 CALCULUS OF GALLBLADDER WITHOUT CHOLECYSTITIS WITHOUT OBSTRUCTION: ICD-10-CM

## 2023-03-07 DIAGNOSIS — K59.00 CONSTIPATION, UNSPECIFIED CONSTIPATION TYPE: ICD-10-CM

## 2023-03-07 DIAGNOSIS — R10.13 EPIGASTRIC PAIN: Primary | ICD-10-CM

## 2023-03-07 DIAGNOSIS — K76.9 LIVER LESION: ICD-10-CM

## 2023-03-07 NOTE — PROGRESS NOTES
[facility] - Outpatient Follow-up Note  Darien Locke 36 y o  female MRN: [id number]  Encounter: [id number]          ASSESSMENT AND PLAN:  14-year-old female with no significant past medical history who presents for follow-up evaluation  1  Epigastric pain  2  Calculus of gallbladder without cholecystitis without obstruction  3  Gastroesophageal reflux disease without esophagitis  She noted postprandial abdominal pain and epigastric burning  She underwent EGD showing small hiatal hernia and otherwise unremarkable exam for causes of her symptoms  Symptoms have mildly improved with daily PPI  Ultrasound showed cholelithiasis  We discussed that her symptoms may be related to symptomatic cholelithiasis  I previously placed a referral to general surgery which she has not yet scheduled and provided her the referral information again today  She will continue GERD dietary/lifestyle antireflux measures in addition to daily PPI-    4  Gastric intestinal metaplasia  She was found to have gastric intestinal metaplasia on August 2022 gastric biopsies  She will repeat EGD in fall 2023 to obtain mapping biopsies    5  Constipation, unspecified constipation type  Continue constipation management with high-fiber diet, fiber supplementation with goal of 25 g/day, adequate hydration    6  Liver lesion  She was found to have a 1 2 cm liver lesion on ultrasound, likely hemangioma  Triphasic MRI was ordered for further work-up  This was denied by her insurance and our office will reach out to her insurance regarding prior authorization/approval status  Follow-up in 6 months  ______________________________________________________________________    SUBJECTIVE: 14-year-old female with no significant past medical history who presents for follow-up evaluation  She was last seen in the GI office September 2022  She had a history of chronic epigastric abdominal pain    She underwent EGD showing small hiatal hernia otherwise normal endoscopic exam   Biopsy showed focal intestinal metaplasia  She was recommended repeat EGD in 1 year  At her last office visit she has had some improvement of her symptoms on daily PPI  She also had history of constipation and was recommended to start fiber supplementation  Interval history: She underwent ultrasound for evaluation of abdominal pain showing cholelithiasis and 1 2 cm echogenic mass in the right lobe of the liver most likely hemangioma  MRI was ordered and is currently scheduled  She was recommended to see general surgery to discuss cholecystectomy but has not scheduled a visit yet  She continues to have intermittent epigastric and right upper quadrant pain after eating  This is worse at night  She is taking omeprazole every day and has had some relief of her symptoms with the medication in addition to dietary changes  She usually has a bowel movement every other day       She has no prior colonoscopy    REVIEW OF SYSTEMS IS OTHERWISE NEGATIVE    10 point review of systems is negative other than stated as per HPI    Historical Information   Past Medical History:   Diagnosis Date   • GERD (gastroesophageal reflux disease)      Past Surgical History:   Procedure Laterality Date   • ANKLE FRACTURE SURGERY Left    •  SECTION     • KS HYSTEROSCOPY,DX,SEP [address] N/A 2022    Procedure: HYSTEROSCOPY;  Surgeon: Alexia Dyer MD;  [facility];  Service: Gynecology   • KS LAP,RMV  ADNEXAL STRUCTURE Bilateral 2022    Procedure: SALPINGECTOMY, LAPAROSCOPIC;  Surgeon: Alexia Dyer MD;  [facility];  Service: Gynecology   • KS REMOVE INTRAUTERINE DEVICE N/A 2022    Procedure: Hysteroscopic removal of IUD portion;  Surgeon: Alexia Dyer MD;  [facility];  Service: Gynecology     Social History   Social History     Substance and Sexual Activity   Alcohol Use Never     Social History     Substance and Sexual Activity   Drug Use Never     Social History     Tobacco Use   Smoking Status Never   Smokeless Tobacco Never     Family History   Problem Relation Age of Onset   • Diabetes Mother    • Diabetes Father    • Heart disease Father    • Diabetes Sister    • Diabetes Brother    • No Known Problems Daughter    • No Known Problems Maternal Grandmother    • No Known Problems Maternal Grandfather    • No Known Problems Paternal Grandmother    • No Known Problems Paternal Grandfather    • No Known Problems Sister    • No Known Problems Daughter        Meds/Allergies       Current Outpatient Medications:   •  acetaminophen (TYLENOL) 650 mg CR tablet  •  ibuprofen (MOTRIN) 600 mg tablet  •  omeprazole (PriLOSEC) 20 mg delayed release capsule  •  psyllium (METAMUCIL) 58 6 % powder    No Known Allergies        Objective     Blood pressure 111/76, pulse 78, temperature 98 °F (36 7 °C), temperature source Tympanic, weight 75 8 kg (167 lb 3 2 oz), not currently breastfeeding  There is no height or weight on file to calculate BMI  PHYSICAL EXAM:      General Appearance:   Alert, cooperative, no distress   HEENT:   Normocephalic, atraumatic, anicteric  Neck:  Supple, symmetrical, trachea midline   Lungs:   Clear to auscultation bilaterally; no rales, rhonchi or wheezing; respirations unlabored    Heart[de-identified]   Regular rate and rhythm; no murmur, rub, or gallop  Abdomen:   Soft, non-tender, non-distended; normal bowel sounds; no masses, no organomegaly    Genitalia:   Deferred    Rectal:   Deferred    Extremities:  No cyanosis, clubbing or edema    Pulses:  2+ and symmetric    Skin:  No jaundice, rashes, or lesions    Lymph nodes:  No palpable cervical lymphadenopathy        Lab Results:   No visits with results within 1 Day(s) from this visit     Latest known visit with results is:   Hospital Outpatient Visit on 08/16/2022   Component Date Value   • Case Report 08/16/2022                      Value:Surgical Pathology Report Case: [id number]                                   Authorizing Provider:  Ayesha Donnelly MD           Collected:           08/16/2022 1501              Ordering Location:     [facility]        Received:            08/16/2022 [address]  [facility]  Endoscopy                                                     Pathologist:           Akira Vázquez MD                                                                Specimens:   A) - Duodenum, bx's r/o celiac                                                                      B) - Stomach, bx's r/o H pylori                                                           • Final Diagnosis 08/16/2022                      Value: This result contains rich text formatting which cannot be displayed here  • Additional Information 08/16/2022                      Value: This result contains rich text formatting which cannot be displayed here  • Gross Description 08/16/2022                      Value: This result contains rich text formatting which cannot be displayed here  Radiology Results:   No results found

## 2023-03-20 NOTE — PROGRESS NOTES
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 to pick a date at that time  She feels like her symptoms are under control with cutting out fatty foods  If symptoms worsen she will return sooner and move up her surgical date  2  Laparoscopic Cholecystectomy with possible Intraoperative Cholangiogram  Possible Robotic assisted      Post Op Pain Management: Motrin, Oxycontin and Tylenol      Ultrasound findings:10/15/22:  1  Cholelithiasis with borderline gallbladder wall and no other signs of acute cholecystitis  Additional evaluation should be considered with gallbladder scan if there is clinical concern for acute cholecystitis  2   1 2 cm echogenic mass in the right lobe of the liver most likely hemangioma  Preoperative Clearance: None      In preparation for this visit all relevant and known prior office notes, prior consultations, emergency room visits, blood work results, and imaging studies were personally reviewed  A total of  20 minutes was spent reviewing all of this information,caring for this patient, providing differential diagnosis, instructions for management, counseling and coordination of care  This also includes planning surgical intervention where indicated  Patient understands hernia occurrence or re-occurrence risk is higher in a diabetic, tobacco user, with elevated BMIs      ____________________________________________________    HPI:  Jose Fisher is a 36 y  o female who was referred for evaluation of The primary encounter diagnosis was Biliary colic  Diagnoses of Symptomatic cholelithiasis and Calculus of gallbladder without cholecystitis without obstruction were also pertinent to this visit      Patient is here after referral from GI for epigastric pain and cholelithiasis  She noted postprandial abdominal pain and epigastric burning  She underwent EGD showing small hiatal hernia and otherwise unremarkable exam for causes of her symptoms  Symptoms have mildly improved with daily PPI  Ultrasound showed cholelithiasis  GI did think some of her symptoms may be from her gallstones and wanted her to be evaluated by generally surgery  She continues GERD dietary/lifestyle antireflux measures in addition to daily PPI  She was found to have gastric intestinal metaplasia on August 2022 gastric biopsies  She will repeat EGD in fall 2023 to obtain mapping biopsies  She is managing her constipation with high-fiber diet, fiber supplementation with goal of 25 g/day, adequate hydration  On US she was found to have a liver lesion, likely hemangioma  GI ordered a Triphasic MRI for further work-up and will be followed for that by GI  Patient has cut out most of her fatty foods and finds decreased pain with eating  She does occasionally get nauseous and will vomit      Prior Abdominal Surgery: cs and salpingectomy     Anticoagulation: none    Smoking Status: Non-smoker     Imaging:   No orders to display           LABS:    Lab Results   Component Value Date    K 3 7 04/02/2022     (H) 04/02/2022    CO2 28 04/02/2022    BUN 11 04/02/2022    CREATININE 0 97 04/02/2022    GLUF 96 04/02/2022    CALCIUM 8 4 04/02/2022    CORRECTEDCA 9 0 04/02/2022    AST 17 04/02/2022    ALT 37 04/02/2022    ALKPHOS 64 04/02/2022    EGFR 72 04/02/2022       Lab Results   Component Value Date    WBC 9 88 07/25/2022    HGB 14 1 07/25/2022    HCT 42 1 07/25/2022    MCV 90 07/25/2022     07/25/2022     No results found for: INR, PROTIME  No results found for: HGBA1C        ROS:  General ROS: negative for - chills, fatigue, fever or night sweats, weight loss  Respiratory ROS: no cough, shortness of breath, or wheezing  Cardiovascular ROS: no chest pain or dyspnea on exertion  Genito-Urinary ROS: no dysuria, trouble voiding, or hematuria  Musculoskeletal ROS: negative for - gait disturbance, joint pain or muscle pain  Neurological ROS: no TIA or stroke symptoms  Gastrointestinal ROS: See HPI   GI ROS: see HPI  Skin ROS: no new rashes or lesions   Lymphatic ROS: no new adenopathy noted by pt  GYN ROS: see HPI, no new GYN history or bleeding noted  Psy ROS: no new mental or behavioral disturbances       Patient Active Problem List   Diagnosis   • Gastroesophageal reflux disease   • Status post bilateral salpingectomy   • Mechanical breakdown of intrauterine contraceptive device   • Status post hysteroscopy w/ IUD removal         Allergies:  Patient has no known allergies        Current Outpatient Medications:   •  omeprazole (PriLOSEC) 20 mg delayed release capsule, TAKE 1 CAPSULE BY MOUTH EVERY DAY, Disp: 90 capsule, Rfl: 5    Past Medical History:   Diagnosis Date   • GERD (gastroesophageal reflux disease)        Past Surgical History:   Procedure Laterality Date   • ANKLE FRACTURE SURGERY Left    •  SECTION     • [place] HYSTEROSCOPY DIAGNOSTIC SEPARATE PROCEDURE N/A 2022    Procedure: HYSTEROSCOPY;  Surgeon: Eitan Arias MD;  [facility];  Service: Gynecology   • WI LAPAROSCOPY W/RMVL ADNEXAL STRUCTURES Bilateral 2022    Procedure: SALPINGECTOMY, LAPAROSCOPIC;  Surgeon: Eitan Arias MD;  [facility];  Service: Gynecology   • WI REMOVAL INTRAUTERINE DEVICE IUD N/A 2022    Procedure: Hysteroscopic removal of IUD portion;  Surgeon: Eitan Arias MD;  [facility];  Service: Gynecology       Family History   Problem Relation Age of Onset   • Diabetes Mother    • Diabetes Father    • Heart disease Father    • Diabetes Sister    • Diabetes Brother    • No Known Problems Daughter    • No Known Problems Maternal Grandmother    • No Known Problems Maternal Grandfather    • No Known Problems Paternal Grandmother    • No Known Problems Paternal Grandfather • No Known Problems Sister    • No Known Problems Daughter         reports that she has never smoked  She has never used smokeless tobacco  She reports that she does not drink alcohol and does not use drugs  Exam:   Vitals:    03/28/23 1457   BP: 128/79   Pulse: 71   Resp: 16   Temp: (!) 97 3 °F (36 3 °C)       PHYSICAL EXAM  General Appearance:    Alert, cooperative, no distress,    Head:    Normocephalic without obvious abnormality   Eyes:    PERRL, conjunctiva/corneas clear,       Neck:   Supple, no adenopathy, no JVD   Back:     Symmetric, no spinal or CVA tenderness   Lungs:     Clear to auscultation bilaterally, no wheezing or rhonchi   Heart:    Regular rate and rhythm, S1 and S2 normal, no murmur   Abdomen:     mild tenderness in the in the epigastrium  Extremities:   Extremities normal  No clubbing, cyanosis or edema   Psych:   Normal Affect, AOx3  Neurologic:  Skin:   CNII-XII intact  Strength symmetric, speech intact    Warm, dry, intact, no visible rashes or lesions      Informed consent for procedure was personally discussed, reviewed, and signed by Dr Andrez Figueroa  Discussion by Dr Andrez Figueroa was carried out regarding risks, benefits, and alternatives with the patient  Risks include but are not limited to:  bleeding, infection, and delayed wound healing or an open wound, pulmonary embolus, leaks from bowel or bile ducts or other viscus, transfusions, death  Discussed in further detail the more common complications and their rates of occurrence   was used if necessary  Patient expressed understanding of the issues discussed and wished/consented to proceed  All questions were answered by Dr Andrez Figueroa  Discussion performed between patient and the provider signing below       Signature:   Manolo Yost    Date: 3/28/2023 Time: 3:01 PM

## 2023-03-22 ENCOUNTER — TELEPHONE (OUTPATIENT)
Dept: OTHER | Facility: OTHER | Age: 43
End: 2023-03-22

## 2023-03-22 NOTE — TELEPHONE ENCOUNTER
Patients MRI was cancelled for today due to insurance not covering scan    Please call patient and follow up

## 2023-03-28 ENCOUNTER — CONSULT (OUTPATIENT)
Dept: SURGERY | Facility: CLINIC | Age: 43
End: 2023-03-28

## 2023-03-28 VITALS
HEART RATE: 71 BPM | RESPIRATION RATE: 16 BRPM | DIASTOLIC BLOOD PRESSURE: 79 MMHG | WEIGHT: 160.4 LBS | SYSTOLIC BLOOD PRESSURE: 128 MMHG | BODY MASS INDEX: 30.29 KG/M2 | TEMPERATURE: 97.3 F | HEIGHT: 61 IN

## 2023-03-28 DIAGNOSIS — K80.20 SYMPTOMATIC CHOLELITHIASIS: ICD-10-CM

## 2023-03-28 DIAGNOSIS — K80.50 BILIARY COLIC: Primary | ICD-10-CM

## 2023-03-28 DIAGNOSIS — K80.20 CALCULUS OF GALLBLADDER WITHOUT CHOLECYSTITIS WITHOUT OBSTRUCTION: ICD-10-CM

## 2023-06-14 ENCOUNTER — OFFICE VISIT (OUTPATIENT)
Dept: SURGERY | Facility: CLINIC | Age: 43
End: 2023-06-14
Payer: COMMERCIAL

## 2023-06-14 VITALS
WEIGHT: 162.2 LBS | DIASTOLIC BLOOD PRESSURE: 76 MMHG | HEIGHT: 61 IN | HEART RATE: 75 BPM | TEMPERATURE: 97.2 F | RESPIRATION RATE: 16 BRPM | SYSTOLIC BLOOD PRESSURE: 109 MMHG | BODY MASS INDEX: 30.62 KG/M2

## 2023-06-14 DIAGNOSIS — K80.12 CALCULUS OF GALLBLADDER WITH ACUTE ON CHRONIC CHOLECYSTITIS WITHOUT OBSTRUCTION: Primary | ICD-10-CM

## 2023-06-14 PROCEDURE — 99213 OFFICE O/P EST LOW 20 MIN: CPT | Performed by: SURGERY

## 2023-06-14 RX ORDER — SODIUM CHLORIDE, SODIUM LACTATE, POTASSIUM CHLORIDE, CALCIUM CHLORIDE 600; 310; 30; 20 MG/100ML; MG/100ML; MG/100ML; MG/100ML
125 INJECTION, SOLUTION INTRAVENOUS CONTINUOUS
OUTPATIENT
Start: 2023-07-24

## 2023-06-14 NOTE — PROGRESS NOTES
Assessment/Plan:    Calculus of gallbladder with acute on chronic cholecystitis without obstruction  We will plan on a laparoscopic cholecystectomy at [facility]   The risks benefits alternatives explained to her she agreeable to proceed       Diagnoses and all orders for this visit:    Calculus of gallbladder with acute on chronic cholecystitis without obstruction  -     Case request operating room: CHOLECYSTECTOMY LAPAROSCOPIC; Standing  -     Case request operating room: CHOLECYSTECTOMY LAPAROSCOPIC    Other orders  -     Diet NPO; Sips with meds; Standing  -     Apply Sequential Compression Device; Standing  -     Place sequential compression device; Standing          Subjective:      Patient ID: Cristina Ellsworth is a 36 y o  female  Ms Brady Prather is a 41-year-old female here for follow-up regarding cholelithiasis  She has been having post prandial pain and was advised by gastroenterology  She underwent an EGD and ultrasound  She had a very small hiatal hernia but she did have gallstones  She was seen here in March but that time was not interested in surgical intervention  She works in [employer] and wanted to wait for the summer to consider surgery  She is here for follow-up  She reports has been doing well in the last few months that she has been avoiding any of her trigger foods  The following portions of the patient's history were reviewed and updated as appropriate: allergies, current medications, past family history, past medical history, past social history, past surgical history and problem list     Review of Systems   Constitutional: Negative for chills and fever  HENT: Negative for ear pain and sore throat  Eyes: Negative for pain and visual disturbance  Respiratory: Negative for cough and shortness of breath  Cardiovascular: Negative for chest pain and palpitations  Gastrointestinal: Negative for abdominal pain and vomiting     Genitourinary: Negative for "dysuria and hematuria  Musculoskeletal: Negative for arthralgias and back pain  Skin: Negative for color change and rash  Neurological: Negative for seizures and syncope  Psychiatric/Behavioral: Negative for agitation and behavioral problems  All other systems reviewed and are negative  Objective:      /76   Pulse 75   Temp (!) 97 2 °F (36 2 °C)   Resp 16   Ht 5' 1\" (1 549 m)   Wt 73 6 kg (162 lb 3 2 oz)   BMI 30 65 kg/m²          Physical Exam  Vitals and nursing note reviewed  Constitutional:       General: She is not in acute distress  Appearance: She is well-developed  She is not diaphoretic  HENT:      Head: Normocephalic and atraumatic  Eyes:      Pupils: Pupils are equal, round, and reactive to light  Cardiovascular:      Rate and Rhythm: Normal rate and regular rhythm  Pulmonary:      Effort: Pulmonary effort is normal  No respiratory distress  Abdominal:      General: Bowel sounds are normal       Palpations: Abdomen is soft  Musculoskeletal:         General: Normal range of motion  Cervical back: Normal range of motion and neck supple  Skin:     General: Skin is warm and dry  Neurological:      Mental Status: She is alert and oriented to person, place, and time     Psychiatric:         Behavior: Behavior normal          "

## 2023-07-19 NOTE — PRE-PROCEDURE INSTRUCTIONS
Pre-Surgery Instructions:   Medication Instructions   • omeprazole (PriLOSEC) 20 mg delayed release capsule Uses PRN- OK to take day of surgery   Medication instructions for day surgery reviewed. Please use only a sip of water to take your instructed medications. Avoid all over the counter vitamins, supplements and NSAIDS for one week prior to surgery per anesthesia guidelines. Tylenol is ok to take as needed. You will receive a call one business day prior to surgery with an arrival time and hospital directions. If your surgery is scheduled on a Monday, the hospital will be calling you on the Friday prior to your surgery. If you have not heard from anyone by 8pm, please call the hospital supervisor through the hospital  at [phone]. Gustavo Howardr [phone]). Do not eat or drink anything after midnight the night before your surgery, including candy, mints, lifesavers, or chewing gum. Do not drink alcohol 24hrs before your surgery. Try not to smoke at least 24hrs before your surgery. Follow the pre surgery showering instructions as listed in the [facility] Surgical Experience Booklet” or otherwise provided by your surgeon's office. Do not shave the surgical area 24 hours before surgery. Do not apply any lotions, creams, including makeup, cologne, deodorant, or perfumes after showering on the day of your surgery. No contact lenses, eye make-up, or artificial eyelashes. Remove nail polish, including gel polish, and any artificial, gel, or acrylic nails if possible. Remove all jewelry including rings and body piercing jewelry. Wear causal clothing that is easy to take on and off. Consider your type of surgery. Keep any valuables, jewelry, piercings at home. Please bring any specially ordered equipment (sling, braces) if indicated. Arrange for a responsible person to drive you to and from the hospital on the day of your surgery. Visitor Guidelines discussed.      Call the surgeon's office with any new illnesses, exposures, or additional questions prior to surgery. Please reference your [facility] Surgical Experience Booklet” for additional information to prepare for your upcoming surgery.

## 2023-07-20 ENCOUNTER — HOSPITAL ENCOUNTER (OUTPATIENT)
Dept: MAMMOGRAPHY | Facility: MEDICAL CENTER | Age: 43
Discharge: HOME | End: 2023-07-20
Payer: COMMERCIAL

## 2023-07-20 VITALS — WEIGHT: 162 LBS | BODY MASS INDEX: 30.58 KG/M2 | HEIGHT: 61 IN

## 2023-07-20 DIAGNOSIS — Z12.31 ENCOUNTER FOR SCREENING MAMMOGRAM FOR MALIGNANT NEOPLASM OF BREAST: ICD-10-CM

## 2023-07-20 PROCEDURE — 77063 BREAST TOMOSYNTHESIS BI: CPT

## 2023-07-20 PROCEDURE — 77067 SCR MAMMO BI INCL CAD: CPT

## 2023-07-21 ENCOUNTER — ANESTHESIA EVENT (OUTPATIENT)
Dept: PERIOP | Facility: HOSPITAL | Age: 43
End: 2023-07-21
Payer: COMMERCIAL

## 2023-07-22 PROBLEM — E66.9 OBESITY (BMI 30.0-34.9): Status: ACTIVE | Noted: 2023-07-22

## 2023-07-22 PROBLEM — E66.811 OBESITY (BMI 30.0-34.9): Status: ACTIVE | Noted: 2023-07-22

## 2023-07-22 NOTE — ANESTHESIA PREPROCEDURE EVALUATION
Procedure:  CHOLECYSTECTOMY LAP (Abdomen)    Relevant Problems   GI/HEPATIC   (+) Gastroesophageal reflux disease      Digestive   (+) Calculus of gallbladder with acute on chronic cholecystitis without obstruction      Other   (+) Obesity (BMI 30.0-34. 9)        Physical Exam    Airway    Mallampati score: I  TM Distance: >3 FB       Dental   No notable dental hx     Cardiovascular  Cardiovascular exam normal    Pulmonary  Pulmonary exam normal     Other Findings        Anesthesia Plan  ASA Score- 2     Anesthesia Type- general with ASA Monitors. Additional Monitors:   Airway Plan: ETT. Plan Factors-Exercise tolerance (METS): >4 METS. Chart reviewed. Existing labs reviewed. Patient is not a current smoker. Obstructive sleep apnea risk education given perioperatively. Induction- intravenous. Postoperative Plan- Plan for postoperative opioid use. Informed Consent- Anesthetic plan and risks discussed with patient.

## 2023-07-24 ENCOUNTER — ANESTHESIA (OUTPATIENT)
Dept: PERIOP | Facility: HOSPITAL | Age: 43
End: 2023-07-24
Payer: COMMERCIAL

## 2023-07-24 ENCOUNTER — HOSPITAL ENCOUNTER (OUTPATIENT)
Facility: HOSPITAL | Age: 43
Setting detail: OUTPATIENT SURGERY
Discharge: HOME | End: 2023-07-24
Attending: SURGERY | Admitting: SURGERY
Payer: COMMERCIAL

## 2023-07-24 VITALS
TEMPERATURE: 97.1 F | OXYGEN SATURATION: 96 % | DIASTOLIC BLOOD PRESSURE: 88 MMHG | HEIGHT: 61 IN | RESPIRATION RATE: 18 BRPM | SYSTOLIC BLOOD PRESSURE: 147 MMHG | WEIGHT: 163.36 LBS | HEART RATE: 74 BPM | BODY MASS INDEX: 30.84 KG/M2

## 2023-07-24 DIAGNOSIS — K80.12 CALCULUS OF GALLBLADDER WITH ACUTE ON CHRONIC CHOLECYSTITIS WITHOUT OBSTRUCTION: ICD-10-CM

## 2023-07-24 LAB
EXT PREGNANCY TEST URINE: NEGATIVE
EXT. CONTROL: NORMAL

## 2023-07-24 PROCEDURE — 47562 LAPAROSCOPIC CHOLECYSTECTOMY: CPT | Performed by: SURGERY

## 2023-07-24 PROCEDURE — NC001 PR NO CHARGE: Performed by: SURGERY

## 2023-07-24 PROCEDURE — 88304 TISSUE EXAM BY PATHOLOGIST: CPT | Performed by: PATHOLOGY

## 2023-07-24 PROCEDURE — 81025 URINE PREGNANCY TEST: CPT | Performed by: SURGERY

## 2023-07-24 RX ORDER — HYDROMORPHONE HCL/PF 1 MG/ML
0.5 SYRINGE (ML) INJECTION
Status: DISCONTINUED | OUTPATIENT
Start: 2023-07-24 | End: 2023-07-24 | Stop reason: HOSPADM

## 2023-07-24 RX ORDER — OXYCODONE HYDROCHLORIDE AND ACETAMINOPHEN 5; 325 MG/1; MG/1
2 TABLET ORAL EVERY 6 HOURS PRN
Status: DISCONTINUED | OUTPATIENT
Start: 2023-07-24 | End: 2023-07-24 | Stop reason: HOSPADM

## 2023-07-24 RX ORDER — SODIUM CHLORIDE 9 MG/ML
125 INJECTION, SOLUTION INTRAVENOUS CONTINUOUS
Status: DISCONTINUED | OUTPATIENT
Start: 2023-07-24 | End: 2023-07-24 | Stop reason: HOSPADM

## 2023-07-24 RX ORDER — ONDANSETRON 2 MG/ML
INJECTION INTRAMUSCULAR; INTRAVENOUS AS NEEDED
Status: DISCONTINUED | OUTPATIENT
Start: 2023-07-24 | End: 2023-07-24

## 2023-07-24 RX ORDER — HYDROMORPHONE HCL/PF 1 MG/ML
SYRINGE (ML) INJECTION AS NEEDED
Status: DISCONTINUED | OUTPATIENT
Start: 2023-07-24 | End: 2023-07-24

## 2023-07-24 RX ORDER — ONDANSETRON 2 MG/ML
4 INJECTION INTRAMUSCULAR; INTRAVENOUS ONCE AS NEEDED
Status: DISCONTINUED | OUTPATIENT
Start: 2023-07-24 | End: 2023-07-24 | Stop reason: HOSPADM

## 2023-07-24 RX ORDER — DEXAMETHASONE SODIUM PHOSPHATE 10 MG/ML
INJECTION, SOLUTION INTRAMUSCULAR; INTRAVENOUS AS NEEDED
Status: DISCONTINUED | OUTPATIENT
Start: 2023-07-24 | End: 2023-07-24

## 2023-07-24 RX ORDER — FENTANYL CITRATE/PF 50 MCG/ML
50 SYRINGE (ML) INJECTION
Status: DISCONTINUED | OUTPATIENT
Start: 2023-07-24 | End: 2023-07-24 | Stop reason: HOSPADM

## 2023-07-24 RX ORDER — OXYCODONE HYDROCHLORIDE AND ACETAMINOPHEN 5; 325 MG/1; MG/1
1 TABLET ORAL EVERY 4 HOURS PRN
Status: DISCONTINUED | OUTPATIENT
Start: 2023-07-24 | End: 2023-07-24 | Stop reason: HOSPADM

## 2023-07-24 RX ORDER — SODIUM CHLORIDE, SODIUM LACTATE, POTASSIUM CHLORIDE, CALCIUM CHLORIDE 600; 310; 30; 20 MG/100ML; MG/100ML; MG/100ML; MG/100ML
125 INJECTION, SOLUTION INTRAVENOUS CONTINUOUS
Status: DISCONTINUED | OUTPATIENT
Start: 2023-07-24 | End: 2023-07-24 | Stop reason: HOSPADM

## 2023-07-24 RX ORDER — MAGNESIUM HYDROXIDE 1200 MG/15ML
LIQUID ORAL AS NEEDED
Status: DISCONTINUED | OUTPATIENT
Start: 2023-07-24 | End: 2023-07-24 | Stop reason: HOSPADM

## 2023-07-24 RX ORDER — PROPOFOL 10 MG/ML
INJECTION, EMULSION INTRAVENOUS AS NEEDED
Status: DISCONTINUED | OUTPATIENT
Start: 2023-07-24 | End: 2023-07-24

## 2023-07-24 RX ORDER — MEPERIDINE HYDROCHLORIDE 25 MG/ML
12.5 INJECTION INTRAMUSCULAR; INTRAVENOUS; SUBCUTANEOUS ONCE AS NEEDED
Status: DISCONTINUED | OUTPATIENT
Start: 2023-07-24 | End: 2023-07-24 | Stop reason: HOSPADM

## 2023-07-24 RX ORDER — PROMETHAZINE HYDROCHLORIDE 25 MG/ML
6.25 INJECTION, SOLUTION INTRAMUSCULAR; INTRAVENOUS ONCE AS NEEDED
Status: DISCONTINUED | OUTPATIENT
Start: 2023-07-24 | End: 2023-07-24 | Stop reason: HOSPADM

## 2023-07-24 RX ORDER — CEFAZOLIN SODIUM 1 G/50ML
1000 SOLUTION INTRAVENOUS ONCE
Status: COMPLETED | OUTPATIENT
Start: 2023-07-24 | End: 2023-07-24

## 2023-07-24 RX ORDER — SODIUM CHLORIDE, SODIUM LACTATE, POTASSIUM CHLORIDE, CALCIUM CHLORIDE 600; 310; 30; 20 MG/100ML; MG/100ML; MG/100ML; MG/100ML
INJECTION, SOLUTION INTRAVENOUS CONTINUOUS PRN
Status: DISCONTINUED | OUTPATIENT
Start: 2023-07-24 | End: 2023-07-24

## 2023-07-24 RX ORDER — MIDAZOLAM HYDROCHLORIDE 2 MG/2ML
INJECTION, SOLUTION INTRAMUSCULAR; INTRAVENOUS AS NEEDED
Status: DISCONTINUED | OUTPATIENT
Start: 2023-07-24 | End: 2023-07-24

## 2023-07-24 RX ORDER — LABETALOL HYDROCHLORIDE 5 MG/ML
INJECTION, SOLUTION INTRAVENOUS AS NEEDED
Status: DISCONTINUED | OUTPATIENT
Start: 2023-07-24 | End: 2023-07-24

## 2023-07-24 RX ORDER — ROCURONIUM BROMIDE 10 MG/ML
INJECTION, SOLUTION INTRAVENOUS AS NEEDED
Status: DISCONTINUED | OUTPATIENT
Start: 2023-07-24 | End: 2023-07-24

## 2023-07-24 RX ORDER — LIDOCAINE HYDROCHLORIDE 20 MG/ML
INJECTION, SOLUTION EPIDURAL; INFILTRATION; INTRACAUDAL; PERINEURAL AS NEEDED
Status: DISCONTINUED | OUTPATIENT
Start: 2023-07-24 | End: 2023-07-24

## 2023-07-24 RX ORDER — OXYCODONE HYDROCHLORIDE 5 MG/1
5 TABLET ORAL EVERY 4 HOURS PRN
Qty: 20 TABLET | Refills: 0 | Status: SHIPPED | OUTPATIENT
Start: 2023-07-24 | End: 2023-08-03

## 2023-07-24 RX ORDER — FENTANYL CITRATE 50 UG/ML
INJECTION, SOLUTION INTRAMUSCULAR; INTRAVENOUS AS NEEDED
Status: DISCONTINUED | OUTPATIENT
Start: 2023-07-24 | End: 2023-07-24

## 2023-07-24 RX ADMIN — SUGAMMADEX 200 MG: 100 INJECTION, SOLUTION INTRAVENOUS at 08:57

## 2023-07-24 RX ADMIN — FENTANYL CITRATE 50 MCG: 50 INJECTION INTRAMUSCULAR; INTRAVENOUS at 07:49

## 2023-07-24 RX ADMIN — ROCURONIUM BROMIDE 50 MG: 10 INJECTION, SOLUTION INTRAVENOUS at 07:28

## 2023-07-24 RX ADMIN — SODIUM CHLORIDE, SODIUM LACTATE, POTASSIUM CHLORIDE, AND CALCIUM CHLORIDE: .6; .31; .03; .02 INJECTION, SOLUTION INTRAVENOUS at 08:01

## 2023-07-24 RX ADMIN — LABETALOL HYDROCHLORIDE 5 MG: 5 INJECTION, SOLUTION INTRAVENOUS at 08:22

## 2023-07-24 RX ADMIN — ONDANSETRON 4 MG: 2 INJECTION INTRAMUSCULAR; INTRAVENOUS at 08:50

## 2023-07-24 RX ADMIN — LIDOCAINE HYDROCHLORIDE 80 MG: 20 INJECTION, SOLUTION EPIDURAL; INFILTRATION; INTRACAUDAL at 07:28

## 2023-07-24 RX ADMIN — FENTANYL CITRATE 50 MCG: 50 INJECTION INTRAMUSCULAR; INTRAVENOUS at 07:28

## 2023-07-24 RX ADMIN — CEFAZOLIN SODIUM 1000 MG: 1 SOLUTION INTRAVENOUS at 07:21

## 2023-07-24 RX ADMIN — FENTANYL CITRATE 50 MCG: 50 INJECTION INTRAMUSCULAR; INTRAVENOUS at 09:39

## 2023-07-24 RX ADMIN — MIDAZOLAM 2 MG: 1 INJECTION INTRAMUSCULAR; INTRAVENOUS at 07:21

## 2023-07-24 RX ADMIN — OXYCODONE AND ACETAMINOPHEN 1 TABLET: 5; 325 TABLET ORAL at 10:58

## 2023-07-24 RX ADMIN — SODIUM CHLORIDE 125 ML/HR: 0.9 INJECTION, SOLUTION INTRAVENOUS at 06:26

## 2023-07-24 RX ADMIN — PROPOFOL 160 MG: 10 INJECTION, EMULSION INTRAVENOUS at 07:28

## 2023-07-24 RX ADMIN — FENTANYL CITRATE 50 MCG: 50 INJECTION INTRAMUSCULAR; INTRAVENOUS at 08:15

## 2023-07-24 RX ADMIN — DEXAMETHASONE SODIUM PHOSPHATE 10 MG: 10 INJECTION INTRAMUSCULAR; INTRAVENOUS at 07:28

## 2023-07-24 RX ADMIN — FENTANYL CITRATE 50 MCG: 50 INJECTION INTRAMUSCULAR; INTRAVENOUS at 07:45

## 2023-07-24 RX ADMIN — HYDROMORPHONE HYDROCHLORIDE 0.5 MG: 1 INJECTION, SOLUTION INTRAMUSCULAR; INTRAVENOUS; SUBCUTANEOUS at 07:54

## 2023-07-24 RX ADMIN — LABETALOL HYDROCHLORIDE 5 MG: 5 INJECTION, SOLUTION INTRAVENOUS at 08:34

## 2023-07-24 RX ADMIN — FENTANYL CITRATE 50 MCG: 50 INJECTION INTRAMUSCULAR; INTRAVENOUS at 09:56

## 2023-07-24 RX ADMIN — PROPOFOL 40 MG: 10 INJECTION, EMULSION INTRAVENOUS at 08:09

## 2023-07-24 NOTE — OP NOTE
OPERATIVE REPORT  PATIENT NAME: Guille Self    :  1980  MRN: [id number]  Pt Location: [facility] 03    SURGERY DATE: 2023    Surgeon(s) and Role:     * Akira Leigh MD - Primary     * Judith Elizondo MD - Assisting    Preop Diagnosis:  Calculus of gallbladder with acute on chronic cholecystitis without obstruction [K80.12]    Post-Op Diagnosis Codes:     * Calculus of gallbladder with acute on chronic cholecystitis without obstruction [K80.12]    Procedure(s):  CHOLECYSTECTOMY LAP    Specimen(s):  ID Type Source Tests Collected by Time Destination   1 : GALLBLADDER Tissue Gallbladder TISSUE EXAM Akira Leigh MD 2023 3299        Estimated Blood Loss:   20 mL    Drains:  NG/OG/Enteral Tube (Active)   Number of days: 364       NG/OG/Enteral Tube Orogastric 18 Fr Center mouth (Active)   Number of days: 0       Anesthesia Type:   General    Operative Indications:  Calculus of gallbladder with acute on chronic cholecystitis without obstruction [K80.12]      Operative Findings:  Chronic calculus cholecystitis    Complications:   None    Procedure and Technique:    The patient was seen again in the Holding Room. The risks, benefits, complications, treatment options, and expected outcomes were discussed with the patient. The possibilities of reaction to medication, pulmonary aspiration, perforation of viscus, bleeding, recurrent infection, finding a normal gallbladder, the need for additional procedures, failure to diagnose a condition, the possible need to convert to an open procedure, and creating a complication requiring transfusion or operation were discussed with the patient. The patient and/or family concurred with the proposed plan, giving informed consent. The site of surgery properly noted/marked.  The patient was taken to Operating Room, identified as Guille Self  and the procedure verified as Laparoscopic Cholecystectomy with possible Intraoperative Cholangiogram. A Time Out was held after prepping and draping in sterile fashion. The above information was confirmed. Prior to the induction of general anesthesia, antibiotic prophylaxis was administered. General endotracheal anesthesia was then administered and tolerated well. After the induction, the abdomen was prepped in the usual sterile fashion. The patient was positioned in the supine position, along with some reverse Trendelenburg. Local anesthetic agent was injected into the skin near the umbilicus and an incision made. The midline fascia was incised and the open technique was used to introduce a  port under direct vision. Pneumoperitoneum was then created with CO2 and was tolerated well without any adverse changes in the patient's vital signs. Additional trocars were introduced under direct vision. All skin incisions were infiltrated with a local anesthetic agent before making the incision and placing the trocars. The patient was placed in reverse Trendelenburg position. The gallbladder was identified, the fundus grasped and retracted cephalad. Adhesions were lysed bluntly and with the electrocautery where indicated, taking care not to injure any adjacent organs or viscus. The infundibulum was grasped and retracted laterally, exposing the peritoneum overlying the triangle of Calot. This was then dissected anteriorily and posteriorly and exposed in a blunt fashion or using cautery where appropriate. The cystic duct was clearly identified and  dissected circumferentially, as was the cystic artery, as the only two tubular structures leading into the gallbladder . The critical view of the [address] [place] [address] Drive was identified. The posterior aspect of the gallbladder was lifted off the cystic plate, to insure that there were no posterior structres behind the gallbladder.       Once this was all clearly identified, the cystic duct was then doubly ligated with surgical clips and/or Endoloop suture on the patient side and singly clipped on the gallbladder side and divided. The cystic artery was re-identified,  ligated with clips and divided as well. The gallbladder was dissected from the liver bed in retrograde fashion with the electrocautery. The gallbladder was placed into an endocatch bag and secured. The liver bed was irrigated and inspected. Hemostasis was achieved with the electrocautery. Copious irrigation was utilized and was repeatedly aspirated until clear. The camera was then switched to the lateral port and directed back to the midline. The specimen was removed under direct visualization from the midline incision. The fascia was then closed using the renfac suture passer and a figure of eight 0 vicryl suture. Pneumoperitoneum was completely reduced after viewing removal of the trocars under direct vision. The wound was thoroughly irrigated. The skin was then closed with 4-0 monocryl and histacryl. Instrument, sponge, and needle counts were correct at closure and at the conclusion of the case. I was present for the entire procedure.     Patient Disposition:  PACU         SIGNATURE: Shanda Jimenez MD  DATE: July 24, 2023  TIME: 9:00 AM

## 2023-07-24 NOTE — DISCHARGE INSTR - AVS FIRST PAGE
[facility] Instructions     Dr. Nemesio Moses MD, [facility]     [phone]          1. General: You will feel pulling sensations around the wound or funny aches and pains around the incisions. This is normal. Even minor surgery is a change in your body and this is your body’s way of reacting to it. If you have had abdominal surgery, it may help to support the incision with a small pillow or blanket for comfort when moving or coughing. 2. Wound care:  Okay to shower. The glue will fall off over the next week or 2. Use ice for at least the 1st 48 hours. Do not use for longer than 20 minutes at a time. Use 3 times per day. 3. Water: You may shower over the wounds. Do not bathe or use a pool or hot tub until cleared by the physician. If you were discharged with a drain, make sure drain site is covered with plastic wrap before showering. 4. Activity: You may go up and down stairs, walk as much as you are comfortable, but walk at least 3 times each day. If you have had abdominal surgery, do not lift anything heavier than 20 pounds for at least 4 weeks. 5. Diet: You may resume a regular diet. If you had a same-day surgery or overnight stay surgery, you may wish to eat lightly for a few days: soups, crackers, and sandwiches. You may resume a regular diet when ready. 6. Medications: Resume all of your previous medications, unless told otherwise by the doctor. Avoid aspirin products for 2-3 days after the date of surgery. You may, at that time, began to take them again. Use Tylenol and Ibuprofen for pain control. You may alternate these medications every 3 hours. For example: you may take Tylenol at noon, Ibuprofen at 3:00 p.m., and Tylenol again at 6:00 p.m., etc. You should use ice to assist with pain control as above. You do not need to take narcotic pain medication unless you are having significant pain.    If you were prescribed a narcotic pain medication containing Tylenol, such as Percocet or Norco, do not use supplemental Tylenol. 7. Driving: You will need someone to drive you home on the day of surgery or discharge. Do not drive or make any important decisions while on narcotic pain medication or 24 hours and after anesthesia or sedation for surgery. Generally, you may drive when your off all narcotic pain medications and you are comfortable. 8. Upset Stomach: You may take Maalox, Tums, or similar items for an upset stomach. If your narcotic pain medication causes an upset stomach, do not take it on an empty stomach. Try taking it with at least some crackers or toast.      9. Constipation: Patients often experience constipation after surgery. You may take over-the-counter medication for this, such as Metamucil, Senokot, Dulcolax, milk of magnesia, etc. You may take a suppository unless you have had anorectal surgery such as a procedure on your hemorrhoids. If you experience significant nausea or vomiting after abdominal surgery, call the office before trying any of these medications. 10. Call the office: If you are experiencing any of the following: fevers above 101.5°, significant nausea or vomiting, if the wound develops drainage and/or there is excessive redness around the wound, or if you have significant diarrhea or other worsening symptoms. 11. Pain: You may be given a prescription for pain medication. This will be sent to your pharmacy prior to discharge. 12. Sexual Activity: You may resume sexual activity when you feel ready and comfortable and your incision is sealed and healed without apparent infection risk. 13. Urination: If you have not urinated in 6 hours, go directly to the ER for evaluation for urinary retention. 14. Follow-up in 2 weeks.

## 2023-07-24 NOTE — H&P
History & Physical    Brooklynn Coles    39 y.o.  female  [id number]  Tripp Lim MD  Date: 2023    Assessment:  Patient Active Problem List   Diagnosis   • Gastroesophageal reflux disease   • Status post bilateral salpingectomy   • Mechanical breakdown of intrauterine contraceptive device   • Status post hysteroscopy w/ IUD removal   • Calculus of gallbladder with acute on chronic cholecystitis without obstruction   • Obesity (BMI 30.0-34. 9)     Plan:  Brooklynn Coles is scheduled for laparoscopic cholecystectomy    HPI    Historical Information   Past Medical History:   Diagnosis Date   • Calculus of gallbladder    • GERD (gastroesophageal reflux disease)    • Mosquito bite     legs     Past Surgical History:   Procedure Laterality Date   • ANKLE FRACTURE SURGERY Left    •  SECTION     • KS HYSTEROSCOPY DIAGNOSTIC SEPARATE PROCEDURE N/A 2022    Procedure: HYSTEROSCOPY;  Surgeon: Liz Hui MD;  [facility];  Service: Gynecology   • KS LAPAROSCOPY W/RMVL ADNEXAL STRUCTURES Bilateral 2022    Procedure: SALPINGECTOMY, LAPAROSCOPIC;  Surgeon: Liz Hui MD;  [facility];  Service: Gynecology   • KS REMOVAL INTRAUTERINE DEVICE IUD N/A 2022    Procedure: Hysteroscopic removal of IUD portion;  Surgeon: Liz Hui MD;  [facility];  Service: Gynecology     Social History   Social History     Substance and Sexual Activity   Alcohol Use Never     Social History     Substance and Sexual Activity   Drug Use Never     Social History     Tobacco Use   Smoking Status Never   Smokeless Tobacco Never     Family History   Problem Relation Age of Onset   • Diabetes Mother    • Diabetes Father    • Heart disease Father    • Diabetes Sister    • Diabetes Brother    • No Known Problems Daughter    • No Known Problems Maternal Grandmother    • No Known Problems Maternal Grandfather    • No Known Problems Paternal Grandmother    • No Known Problems Paternal Grandfather    • No Known Problems Sister    • No Known Problems Daughter         Meds/Allergies   No Known Allergies    Current Facility-Administered Medications:   •  ceFAZolin (ANCEF) IVPB (premix in dextrose) 1,000 mg 50 mL, 1,000 mg, Intravenous, Once, Hernesto Menchaca MD  •  lactated ringers infusion, 125 mL/hr, Intravenous, Continuous, Hernesto Menchaca MD  •  sodium chloride 0.9 % infusion, 125 mL/hr, Intravenous, Continuous, Sangita Mock, DO, Last Rate: 125 mL/hr at 07/24/23 2612, Continue from Pre-op at 07/24/23 0722    Review of Systems    Vitals:    07/24/23 0605   BP: 133/82   Pulse: 73   Resp: (!) 73   Temp: (!) 97.4 °F (36.3 °C)   SpO2: 98%     Physical Exam  GEN: NAD, A+OX3   HEENT: Normocephalic, atraumatic,   NECK: Supple, trachea midline,   CARDIAC: regular rate & rhythm, S1 & S2 normal.   LUNGS: Clear to auscultation, No Wheeze, Rales, or Rhonchi  ABDOMEN: Soft nontender  EXTREMITIES: No evidence of cyanosis, clubbing or edema. Pulses +2 B/L LE  NEURO: CN II-XII intact grossly, No sensory or motor deficits    Lab Results: I have personally reviewed pertinent lab results. Imaging: I have personally reviewed pertinent reports.     EKG, Pathology, and Other Studies:   Lab Results   Component Value Date    CALCIUM 8.4 04/02/2022    K 3.7 04/02/2022    CO2 28 04/02/2022     (H) 04/02/2022    BUN 11 04/02/2022    CREATININE 0.97 04/02/2022     Lab Results   Component Value Date    WBC 9.88 07/25/2022    HGB 14.1 07/25/2022    HCT 42.1 07/25/2022    MCV 90 07/25/2022     07/25/2022     Lab Results   Component Value Date    ALT 37 04/02/2022    AST 17 04/02/2022    ALKPHOS 64 04/02/2022

## 2023-07-24 NOTE — ANESTHESIA POSTPROCEDURE EVALUATION
Post-Op Assessment Note    CV Status:  Stable    Pain management: adequate     Mental Status:  Alert and awake   Hydration Status:  Euvolemic   PONV Controlled:  Controlled   Airway Patency:  Patent      Post Op Vitals Reviewed: Yes      Staff: Anesthesiologist         No notable events documented.     BP      Temp     Pulse     Resp      SpO2      /79   Pulse 68   Temp (!) 97.1 °F (36.2 °C) (Temporal)   Resp 15   Ht 5' 1" (1.549 m)   Wt 74.1 kg (163 lb 5.8 oz)   LMP 07/11/2023   SpO2 93%   Breastfeeding No   BMI 30.87 kg/m²

## 2023-07-27 PROCEDURE — 88304 TISSUE EXAM BY PATHOLOGIST: CPT | Performed by: PATHOLOGY

## 2023-07-28 ENCOUNTER — TELEPHONE (OUTPATIENT)
Dept: SURGERY | Facility: CLINIC | Age: 43
End: 2023-07-28

## 2023-07-28 NOTE — TELEPHONE ENCOUNTER
Post op call. Left message for patient. Asked how patient was doing, if there were any questions or concerns. Reminded patient of post op appointment and to call if there are any questions or concerns prior to appointment.

## 2023-08-03 NOTE — PROGRESS NOTES
Assessment/Plan:   Manuel Farooq is a 39 y. o.female who comes in today for postoperative check after laparoscopic cholecystectomy 7/24/23. Patient is pleased with the outcome of surgery and is doing well. Pathology: Pathology reviewed with patient, all questions answered. Final Diagnosis   A. Gall Bladder, Laparoscopic Cholecystectomy:  - Chronic cholecystitis with cholelithiasis. Restrictions reviewed. ______________________________________________________  HPI:  Manuel Farooq is a 39 y. o.female who comes in today for postoperative check after recent  laparoscopic cholecystectomy 7/24/23. Currently doing well without problems, no fever or chills,no nausea and no vomiting. Patient reports they have resumed their normal diet. denies biliary diarrhea. Reports no issues. ROS:  General ROS: negative for - chills, fatigue, fever or night sweats, weight loss  Respiratory ROS: no cough, shortness of breath, or wheezing  Cardiovascular ROS: no chest pain or dyspnea on exertion  Genito-Urinary ROS: no dysuria, trouble voiding, or hematuria  Musculoskeletal ROS: negative for - gait disturbance, joint pain or muscle pain  Neurological ROS: no TIA or stroke symptoms  GI ROS: see HPI  Skin ROS: no new rashes or lesions   Lymphatic ROS: no new adenopathy noted by pt. Psy ROS: no new mental or behavioral disturbances       Patient Active Problem List   Diagnosis   • Gastroesophageal reflux disease   • Status post bilateral salpingectomy   • Mechanical breakdown of intrauterine contraceptive device   • Status post hysteroscopy w/ IUD removal   • Calculus of gallbladder with acute on chronic cholecystitis without obstruction   • Obesity (BMI 30.0-34. 9)           Allergies:  Patient has no known allergies.       Current Outpatient Medications:   •  omeprazole (PriLOSEC) 20 mg delayed release capsule, TAKE 1 CAPSULE BY MOUTH EVERY DAY (Patient taking differently: daily as needed), Disp: 90 capsule, Rfl: 3    Past Medical History:   Diagnosis Date   • Calculus of gallbladder    • GERD (gastroesophageal reflux disease)    • Mosquito bite     legs       Past Surgical History:   Procedure Laterality Date   • ANKLE FRACTURE SURGERY Left    •  SECTION     • ND HYSTEROSCOPY DIAGNOSTIC SEPARATE PROCEDURE N/A 2022    Procedure: HYSTEROSCOPY;  Surgeon: Patsy Hadley MD;  [facility];  Service: Gynecology   • ND LAPAROSCOPY SURG CHOLECYSTECTOMY N/A 2023    Procedure: CHOLECYSTECTOMY LAP;  Surgeon: Warren Nash MD;  [facility];  Service: General   • ND LAPAROSCOPY W/RMVL ADNEXAL STRUCTURES Bilateral 2022    Procedure: SALPINGECTOMY, LAPAROSCOPIC;  Surgeon: Patsy Hadley MD;  [facility];  Service: Gynecology   • ND REMOVAL INTRAUTERINE DEVICE IUD N/A 2022    Procedure: Hysteroscopic removal of IUD portion;  Surgeon: Patsy Hadley MD;  [facility];  Service: Gynecology       Family History   Problem Relation Age of Onset   • Diabetes Mother    • Diabetes Father    • Heart disease Father    • Diabetes Sister    • Diabetes Brother    • No Known Problems Daughter    • No Known Problems Maternal Grandmother    • No Known Problems Maternal Grandfather    • No Known Problems Paternal Grandmother    • No Known Problems Paternal Grandfather    • No Known Problems Sister    • No Known Problems Daughter         reports that she has never smoked. She has never used smokeless tobacco. She reports that she does not drink alcohol and does not use drugs.     Vitals:    23 1035   BP: 110/77   Pulse: 69   Resp: 16   Temp: (!) 96.9 °F (36.1 °C)       PHYSICAL EXAM  General: normal, cooperative, no distress  Abdominal: soft, nondistended or nontender  Incision: clean, dry, and intact and healing well        Danielle Pina PA-C      Date: 2023 Time: 10:39 AM

## 2023-08-08 ENCOUNTER — OFFICE VISIT (OUTPATIENT)
Dept: SURGERY | Facility: CLINIC | Age: 43
End: 2023-08-08

## 2023-08-08 VITALS
DIASTOLIC BLOOD PRESSURE: 77 MMHG | TEMPERATURE: 96.9 F | BODY MASS INDEX: 31.08 KG/M2 | HEART RATE: 69 BPM | SYSTOLIC BLOOD PRESSURE: 110 MMHG | WEIGHT: 164.6 LBS | HEIGHT: 61 IN | RESPIRATION RATE: 16 BRPM

## 2023-08-08 DIAGNOSIS — Z90.49 S/P CHOLECYSTECTOMY: Primary | ICD-10-CM

## 2023-08-08 PROCEDURE — 99024 POSTOP FOLLOW-UP VISIT: CPT | Performed by: PHYSICIAN ASSISTANT

## 2023-08-14 ENCOUNTER — ANNUAL EXAM (OUTPATIENT)
Dept: OBGYN CLINIC | Facility: MEDICAL CENTER | Age: 43
End: 2023-08-14
Payer: COMMERCIAL

## 2023-08-14 VITALS
SYSTOLIC BLOOD PRESSURE: 100 MMHG | BODY MASS INDEX: 31.81 KG/M2 | DIASTOLIC BLOOD PRESSURE: 72 MMHG | HEIGHT: 61 IN | WEIGHT: 168.5 LBS

## 2023-08-14 DIAGNOSIS — R10.2 PELVIC PAIN: ICD-10-CM

## 2023-08-14 DIAGNOSIS — Z12.31 ENCOUNTER FOR SCREENING MAMMOGRAM FOR MALIGNANT NEOPLASM OF BREAST: ICD-10-CM

## 2023-08-14 DIAGNOSIS — Z01.419 WELL WOMAN EXAM WITH ROUTINE GYNECOLOGICAL EXAM: Primary | ICD-10-CM

## 2023-08-14 PROCEDURE — 99396 PREV VISIT EST AGE 40-64: CPT | Performed by: OBSTETRICS & GYNECOLOGY

## 2023-08-22 ENCOUNTER — HOSPITAL ENCOUNTER (OUTPATIENT)
Dept: ULTRASOUND IMAGING | Facility: HOSPITAL | Age: 43
Discharge: HOME | End: 2023-08-22
Attending: OBSTETRICS & GYNECOLOGY
Payer: COMMERCIAL

## 2023-08-22 DIAGNOSIS — R10.2 PELVIC PAIN: ICD-10-CM

## 2023-08-22 PROCEDURE — 76830 TRANSVAGINAL US NON-OB: CPT

## 2023-08-22 PROCEDURE — 76856 US EXAM PELVIC COMPLETE: CPT

## 2024-05-22 ENCOUNTER — OFFICE VISIT (OUTPATIENT)
Dept: FAMILY MEDICINE CLINIC | Facility: CLINIC | Age: 44
End: 2024-05-22
Payer: COMMERCIAL

## 2024-05-22 ENCOUNTER — APPOINTMENT (OUTPATIENT)
Dept: LAB | Facility: CLINIC | Age: 44
End: 2024-05-22
Payer: COMMERCIAL

## 2024-05-22 VITALS
WEIGHT: 164 LBS | DIASTOLIC BLOOD PRESSURE: 82 MMHG | HEART RATE: 78 BPM | SYSTOLIC BLOOD PRESSURE: 122 MMHG | BODY MASS INDEX: 30.96 KG/M2 | HEIGHT: 61 IN | OXYGEN SATURATION: 98 % | TEMPERATURE: 97.5 F

## 2024-05-22 DIAGNOSIS — M77.12 LEFT TENNIS ELBOW: ICD-10-CM

## 2024-05-22 DIAGNOSIS — Z83.3 FAMILY HISTORY OF DIABETES MELLITUS TYPE II: ICD-10-CM

## 2024-05-22 DIAGNOSIS — E66.09 CLASS 1 OBESITY DUE TO EXCESS CALORIES WITH BODY MASS INDEX (BMI) OF 30.0 TO 30.9 IN ADULT, UNSPECIFIED WHETHER SERIOUS COMORBIDITY PRESENT: ICD-10-CM

## 2024-05-22 DIAGNOSIS — R51.9 NONINTRACTABLE HEADACHE, UNSPECIFIED CHRONICITY PATTERN, UNSPECIFIED HEADACHE TYPE: ICD-10-CM

## 2024-05-22 DIAGNOSIS — R20.2 TINGLING OF BOTH FEET: ICD-10-CM

## 2024-05-22 DIAGNOSIS — R20.2 TINGLING OF BOTH FEET: Primary | ICD-10-CM

## 2024-05-22 PROBLEM — E66.811 CLASS 1 OBESITY DUE TO EXCESS CALORIES WITH BODY MASS INDEX (BMI) OF 30.0 TO 30.9 IN ADULT: Status: ACTIVE | Noted: 2024-05-22

## 2024-05-22 LAB
25(OH)D3 SERPL-MCNC: 13.2 NG/ML (ref 30–100)
ALBUMIN SERPL BCP-MCNC: 4 G/DL (ref 3.5–5)
ALP SERPL-CCNC: 64 U/L (ref 34–104)
ALT SERPL W P-5'-P-CCNC: 25 U/L (ref 7–52)
ANION GAP SERPL CALCULATED.3IONS-SCNC: 10 MMOL/L (ref 4–13)
AST SERPL W P-5'-P-CCNC: 17 U/L (ref 13–39)
BASOPHILS # BLD AUTO: 0.04 THOUSANDS/ÂΜL (ref 0–0.1)
BASOPHILS NFR BLD AUTO: 0 % (ref 0–1)
BILIRUB SERPL-MCNC: 0.5 MG/DL (ref 0.2–1)
BUN SERPL-MCNC: 17 MG/DL (ref 5–25)
CALCIUM SERPL-MCNC: 8.9 MG/DL (ref 8.4–10.2)
CHLORIDE SERPL-SCNC: 104 MMOL/L (ref 96–108)
CHOLEST SERPL-MCNC: 156 MG/DL
CO2 SERPL-SCNC: 24 MMOL/L (ref 21–32)
CREAT SERPL-MCNC: 0.84 MG/DL (ref 0.6–1.3)
EOSINOPHIL # BLD AUTO: 0.1 THOUSAND/ÂΜL (ref 0–0.61)
EOSINOPHIL NFR BLD AUTO: 1 % (ref 0–6)
ERYTHROCYTE [DISTWIDTH] IN BLOOD BY AUTOMATED COUNT: 13.9 % (ref 11.6–15.1)
EST. AVERAGE GLUCOSE BLD GHB EST-MCNC: 111 MG/DL
GFR SERPL CREATININE-BSD FRML MDRD: 84 ML/MIN/1.73SQ M
GLUCOSE P FAST SERPL-MCNC: 86 MG/DL (ref 65–99)
HBA1C MFR BLD: 5.5 %
HCT VFR BLD AUTO: 43.3 % (ref 34.8–46.1)
HDLC SERPL-MCNC: 50 MG/DL
HGB BLD-MCNC: 13.9 G/DL (ref 11.5–15.4)
IMM GRANULOCYTES # BLD AUTO: 0.03 THOUSAND/UL (ref 0–0.2)
IMM GRANULOCYTES NFR BLD AUTO: 0 % (ref 0–2)
LDLC SERPL CALC-MCNC: 90 MG/DL (ref 0–100)
LYMPHOCYTES # BLD AUTO: 3.55 THOUSANDS/ÂΜL (ref 0.6–4.47)
LYMPHOCYTES NFR BLD AUTO: 37 % (ref 14–44)
MCH RBC QN AUTO: 29.2 PG (ref 26.8–34.3)
MCHC RBC AUTO-ENTMCNC: 32.1 G/DL (ref 31.4–37.4)
MCV RBC AUTO: 91 FL (ref 82–98)
MONOCYTES # BLD AUTO: 0.45 THOUSAND/ÂΜL (ref 0.17–1.22)
MONOCYTES NFR BLD AUTO: 5 % (ref 4–12)
NEUTROPHILS # BLD AUTO: 5.34 THOUSANDS/ÂΜL (ref 1.85–7.62)
NEUTS SEG NFR BLD AUTO: 57 % (ref 43–75)
NRBC BLD AUTO-RTO: 0 /100 WBCS
PLATELET # BLD AUTO: 317 THOUSANDS/UL (ref 149–390)
PMV BLD AUTO: 11.9 FL (ref 8.9–12.7)
POTASSIUM SERPL-SCNC: 3.9 MMOL/L (ref 3.5–5.3)
PROT SERPL-MCNC: 6.9 G/DL (ref 6.4–8.4)
RBC # BLD AUTO: 4.76 MILLION/UL (ref 3.81–5.12)
SODIUM SERPL-SCNC: 138 MMOL/L (ref 135–147)
TRIGL SERPL-MCNC: 79 MG/DL
TSH SERPL DL<=0.05 MIU/L-ACNC: 1.3 UIU/ML (ref 0.45–4.5)
WBC # BLD AUTO: 9.51 THOUSAND/UL (ref 4.31–10.16)

## 2024-05-22 PROCEDURE — 99214 OFFICE O/P EST MOD 30 MIN: CPT | Performed by: FAMILY MEDICINE

## 2024-05-22 PROCEDURE — 80061 LIPID PANEL: CPT

## 2024-05-22 PROCEDURE — 36415 COLL VENOUS BLD VENIPUNCTURE: CPT

## 2024-05-22 PROCEDURE — 84443 ASSAY THYROID STIM HORMONE: CPT

## 2024-05-22 PROCEDURE — 80053 COMPREHEN METABOLIC PANEL: CPT

## 2024-05-22 PROCEDURE — 82306 VITAMIN D 25 HYDROXY: CPT

## 2024-05-22 PROCEDURE — 85025 COMPLETE CBC W/AUTO DIFF WBC: CPT

## 2024-05-22 PROCEDURE — 83036 HEMOGLOBIN GLYCOSYLATED A1C: CPT

## 2024-05-22 NOTE — PROGRESS NOTES
Ambulatory Visit  Name: Vania Baird      : 1980      MRN: [id number]  Encounter Provider: Bertha Worrell DO  Encounter Date: 2024   Encounter department: [facility] PRIMARY CARE  Chief Complaint   Patient presents with    Follow-up     Would like to have blood sugar and blood pressure checked has not been feeling well   Has been having headaches and feeling of has a family hx of diabetes      Patient Instructions   Here for headaches and right foot tingling and left foot tingling and worse at night and left tennis elbow. Strong family hx of diabetes, check for diabetes. BP stable. Monitor headaches and may use Tylenol for pain and for headache. Consult ortho for left tennis elbow. Lose weight to get BMI lower than 25.     Assessment & Plan   1. Tingling of both feet  -     Comprehensive metabolic panel; Future; Expected date: 2024  -     CBC and differential; Future; Expected date: 2024  -     Lipid Panel with Direct LDL reflex; Future; Expected date: 2024  -     TSH, 3rd generation with Free T4 reflex; Future; Expected date: 2024  -     Hemoglobin A1C; Future; Expected date: 2024  -     Vitamin D 25 hydroxy; Future; Expected date: 2024  2. Family history of diabetes mellitus type II  -     Comprehensive metabolic panel; Future; Expected date: 2024  -     CBC and differential; Future; Expected date: 2024  -     Lipid Panel with Direct LDL reflex; Future; Expected date: 2024  -     TSH, 3rd generation with Free T4 reflex; Future; Expected date: 2024  -     Hemoglobin A1C; Future; Expected date: 2024  -     Vitamin D 25 hydroxy; Future; Expected date: 2024  3. Nonintractable headache, unspecified chronicity pattern, unspecified headache type  -     Comprehensive metabolic panel; Future; Expected date: 2024  -     CBC and differential; Future; Expected date: 2024  -     Lipid Panel with Direct LDL  reflex; Future; Expected date: 05/22/2024  -     TSH, 3rd generation with Free T4 reflex; Future; Expected date: 05/22/2024  -     Hemoglobin A1C; Future; Expected date: 05/22/2024  -     Vitamin D 25 hydroxy; Future; Expected date: 05/22/2024  4. Left tennis elbow  -     Comprehensive metabolic panel; Future; Expected date: 05/22/2024  -     CBC and differential; Future; Expected date: 05/22/2024  -     Ambulatory Referral to Orthopedic Surgery; Future  5. Class 1 obesity due to excess calories with body mass index (BMI) of 30.0 to 30.9 in adult, unspecified whether serious comorbidity present  -     Comprehensive metabolic panel; Future; Expected date: 05/22/2024  -     CBC and differential; Future; Expected date: 05/22/2024  -     Lipid Panel with Direct LDL reflex; Future; Expected date: 05/22/2024  -     TSH, 3rd generation with Free T4 reflex; Future; Expected date: 05/22/2024  -     Hemoglobin A1C; Future; Expected date: 05/22/2024  -     Vitamin D 25 hydroxy; Future; Expected date: 05/22/2024    @[email or web address]@  History of Present Illness     Follow-up (Would like to have blood sugar and blood pressure checked has not been feeling well  Has been having headaches and feeling of has a family hx of diabetes )  Has left tennis elbo. Patient tried massage and cream but does not help and is chronic for 2 months. Tylenol helps.         Review of Systems   Constitutional: Negative.    HENT: Negative.     Eyes: Negative.    Respiratory: Negative.     Cardiovascular: Negative.    Gastrointestinal: Negative.    Endocrine: Negative.    Genitourinary: Negative.    Musculoskeletal:         Left tennis elbow symptoms   Skin: Negative.    Allergic/Immunologic: Negative.    Neurological:  Positive for headaches (intermittent).   Hematological: Negative.    Psychiatric/Behavioral: Negative.         Objective     /82 (BP Location: Left arm, Patient Position: Sitting, Cuff Size: Adult)   Pulse 78   Temp 97.5 °F (36.4 °C)  "(Temporal)   Ht 5' 1\" (1.549 m)   Wt 74.4 kg (164 lb)   SpO2 98%   BMI 30.99 kg/m²     Physical Exam  Constitutional:       Appearance: She is well-developed. She is obese.   HENT:      Head: Normocephalic and atraumatic.      Right Ear: External ear normal.      Left Ear: External ear normal.      Nose: Nose normal.      Mouth/Throat:      Mouth: Mucous membranes are moist.   Eyes:      Conjunctiva/sclera: Conjunctivae normal.      Pupils: Pupils are equal, round, and reactive to light.   Cardiovascular:      Rate and Rhythm: Normal rate and regular rhythm.      Pulses: Normal pulses.      Heart sounds: Normal heart sounds.   Pulmonary:      Effort: Pulmonary effort is normal.      Breath sounds: Normal breath sounds.   Musculoskeletal:         General: Normal range of motion.      Cervical back: Normal range of motion and neck supple.      Comments: Left lateral epicondylitis   Skin:     General: Skin is warm and dry.      Capillary Refill: Capillary refill takes less than 2 seconds.   Neurological:      General: No focal deficit present.      Mental Status: She is alert and oriented to person, place, and time. Mental status is at baseline.      Deep Tendon Reflexes: Reflexes are normal and symmetric.   Psychiatric:         Mood and Affect: Mood normal.         Behavior: Behavior normal.         Thought Content: Thought content normal.         Judgment: Judgment normal.       Administrative Statements   I have spent a total time of 30 minutes on 05/22/24 In caring for this patient including Diagnostic results, Prognosis, Risks and benefits of tx options, Instructions for management, Patient and family education, Importance of tx compliance, Risk factor reductions, Impressions, Counseling / Coordination of care, Documenting in the medical record, Reviewing / ordering tests, medicine, procedures  , and Obtaining or reviewing history  .     "

## 2024-05-22 NOTE — PATIENT INSTRUCTIONS
Here for headaches and right foot tingling and left foot tingling and worse at night and left tennis elbow. Strong family hx of diabetes, check for diabetes. BP stable. Monitor headaches and may use Tylenol for pain and for headache. Consult ortho for left tennis elbow. Lose weight to get BMI lower than 25.

## 2024-06-10 ENCOUNTER — OFFICE VISIT (OUTPATIENT)
Dept: OBGYN CLINIC | Facility: MEDICAL CENTER | Age: 44
End: 2024-06-10
Payer: COMMERCIAL

## 2024-06-10 VITALS
HEART RATE: 68 BPM | SYSTOLIC BLOOD PRESSURE: 118 MMHG | DIASTOLIC BLOOD PRESSURE: 80 MMHG | BODY MASS INDEX: 30.96 KG/M2 | HEIGHT: 61 IN | WEIGHT: 164 LBS

## 2024-06-10 DIAGNOSIS — M77.12 LEFT TENNIS ELBOW: ICD-10-CM

## 2024-06-10 PROCEDURE — 99203 OFFICE O/P NEW LOW 30 MIN: CPT | Performed by: EMERGENCY MEDICINE

## 2024-06-10 NOTE — PATIENT INSTRUCTIONS
You may use Advil (ibuprofen) 400-600mg every 6 hours or at least twice per day (OR Aleve (naproxen) 250-500mg every 12 hours as needed for pain and inflammation).  However do not mix or take other NSAIDs together such as Advil, Motrin, ibuprofen, Celebrex, naproxen, diclofenac or Aleve.    You may also take Tylenol (acetaminophen) together with Advil (ibuprofen) or Aleve (naproxen) as this is safe and can help decrease your pain levels.  The dosing for Tylenol is 500mg every 4-6 hours as needed OR max 1,000mg per dose up to 3 times per day for a total of 3,000mg per day  *Check with your primary care physician to see if these medications are safe to take and to make sure they do not interfere with your other medications and medical issues.        Tennis Elbow Exercises   AMBULATORY CARE:   Tennis elbow exercises  help decrease pain in your elbow, forearm, wrist, and hand. They also help strengthen your arm muscles and prevent more injury.  Call your doctor if:   You have increased pain or weakness in your arm, wrist, hand, or fingers.    You have new numbness or tingling in your arm, hand, or fingers.    You have questions or concerns about tennis elbow exercises.    What you need to know about exercise safety:   Start slowly.  These are beginning exercises. Ask your healthcare provider if you need to see a physical therapist for more advanced exercises. As you get stronger, you may be able to do more sets of each exercise or add weights.    Stop if you feel pain.  It is normal to feel some discomfort at first, but you should not feel pain. Regular exercise will help decrease your discomfort over time.    Follow the exercise program recommended by your healthcare provider.  He or she will tell you which exercises are best for your condition. He or she will also tell you how many repetitions to do and how often you should do the exercises.    Perform tennis elbow stretches safely:  Ask your healthcare provider how  often to do these stretches:  Finger extensions:  Hold your fingers close together. Put a rubber band around the outside of your fingers and thumb. Spread your fingers apart and then slowly bring them together without letting the rubber band fall off. Repeat 10 times.         Wrist flexor stretch:  Hold your arm straight out in front of you with your palm facing down. Use your other hand to grasp your fingers. Keep your elbow straight and slowly bend your hand back. Your fingertips should point up and your palm should face away from you. Do this until you feel a stretch in the top of your wrist. Hold for 10 seconds. Repeat 5 times.         Wrist extensor stretch:  This stretch is the opposite of the wrist flexor stretch. Hold your arm straight out in front of you with your palm facing down. Use your other hand to grasp your fingers. Keep your elbow straight and slowly bend your hand down. Your fingertips should point down and your palm should face you. Do this until you feel a stretch in your wrist. Hold for 10 seconds. Repeat 5 times.       Perform tennis elbow strengthening exercises safely:  Always stretch before you do strengthening exercises. As you get stronger, your healthcare provider may tell you to you add weights or more repetitions to your strengthening exercises. He or she will tell you how much weight to use.  Wrist curls:  Hold your arm out in front of you. Turn your palm so that it faces up and hold a weight in your hand. Ask your healthcare provider how much weight you should use. Slowly bend and straighten your wrist 30 times.         Bicep curls:  Place your hand under your injured elbow for support. Turn your palm so that it faces up and hold a weight in your hand. Ask your healthcare provider how much weight you should use. Slowly bend and straighten your elbow 30 times.         :  Hold a soft rubber ball or tennis ball in your hand. Squeeze the ball as hard as you can and hold this  position. Ask your healthcare provider how long to hold this position. Repeat this exercise as directed by your healthcare provider.    Follow up with your doctor as directed:  Write down your questions so you remember to ask them during your visits.  © Copyright [employer] 2023 Information is for End User's use only and may not be sold, redistributed or otherwise used for commercial purposes.  The above information is an  only. It is not intended as medical advice for individual conditions or treatments. Talk to your doctor, nurse or pharmacist before following any medical regimen to see if it is safe and effective for you.

## 2024-06-10 NOTE — PROGRESS NOTES
"    Assessment/Plan:    Diagnoses and all orders for this visit:    Left tennis elbow  -     Ambulatory Referral to Orthopedic Surgery  -     Durable Medical Equipment    Symptoms consistent with tennis elbow since March discussed treatment options we have provided home exercises as well as a tennis elbow strap to use during activities.  We discussed anti-inflammatories as she has only been using Tylenol up until this point.  Patient declines corticosteroid injection.    Return in about 6 weeks (around 7/22/2024).      Subjective:   Patient ID: Vania Baird is a 43 y.o. female.    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  She has tried Tylenol for her pain, occasional ice      Review of Systems    The following portions of the patient's chart were reviewed and updated as appropriate:   Allergy:  No Known Allergies    Medications:    Current Outpatient Medications:     omeprazole (PriLOSEC) 20 mg delayed release capsule, TAKE 1 CAPSULE BY MOUTH EVERY DAY (Patient not taking: Reported on 8/8/2023), Disp: 90 capsule, Rfl: 3    Patient Active Problem List   Diagnosis    Gastroesophageal reflux disease    Status post bilateral salpingectomy    Mechanical breakdown of intrauterine contraceptive device    Status post hysteroscopy w/ IUD removal    Calculus of gallbladder with acute on chronic cholecystitis without obstruction    Obesity (BMI 30.0-34.9)    Class 1 obesity due to excess calories with body mass index (BMI) of 30.0 to 30.9 in adult    Family history of diabetes mellitus type II    Left tennis elbow       Objective:  /80   Pulse 68   Ht 5' 1\" (1.549 m)   Wt 74.4 kg (164 lb)   BMI 30.99 kg/m²     Left Elbow Exam     Tenderness   The patient is experiencing tenderness in the lateral epicondyle.     Range of Motion   The patient has normal left elbow ROM.    Other   Erythema: absent  Sensation: normal  Pulse: present    Comments:  Lateral elbow pain " reproduced with resisted third digit extension as well as wrist extension, strength 5 out of 5            Physical Exam      Neurologic Exam    Procedures    There are no pertinent studies obtained with regards to today's office visit.            Past Medical History:   Diagnosis Date    Calculus of gallbladder     GERD (gastroesophageal reflux disease)     Mosquito bite     legs       Past Surgical History:   Procedure Laterality Date    ANKLE FRACTURE SURGERY Left      SECTION      IA HYSTEROSCOPY DIAGNOSTIC SEPARATE PROCEDURE N/A 2022    Procedure: HYSTEROSCOPY;  Surgeon: Jane Mayo MD;  [facility];  Service: Gynecology    IA LAPAROSCOPY SURG CHOLECYSTECTOMY N/A 2023    Procedure: CHOLECYSTECTOMY LAP;  Surgeon: Carlos Enrique Goins MD;  [facility];  Service: General    IA LAPAROSCOPY W/RMVL ADNEXAL STRUCTURES Bilateral 2022    Procedure: SALPINGECTOMY, LAPAROSCOPIC;  Surgeon: Jane Mayo MD;  [facility];  Service: Gynecology    IA REMOVAL INTRAUTERINE DEVICE IUD N/A 2022    Procedure: Hysteroscopic removal of IUD portion;  Surgeon: Jane Mayo MD;  [facility];  Service: Gynecology       Social History     Socioeconomic History    Marital status: Single     Spouse name: Not on file    Number of children: Not on file    Years of education: Not on file    Highest education level: Not on file   Occupational History    Not on file   Tobacco Use    Smoking status: Never    Smokeless tobacco: Never   Vaping Use    Vaping status: Never Used   Substance and Sexual Activity    Alcohol use: Never    Drug use: Never    Sexual activity: Yes     Partners: Male     Birth control/protection: Female Sterilization   Other Topics Concern    Not on file   Social History Narrative    Not on file     Social Determinants of Health     Financial Resource Strain: Not on file   Food Insecurity: Not on file   Transportation Needs: Not on file   Physical  Activity: Not on file   Stress: Not on file   Social Connections: Not on file   Intimate Partner Violence: Not on file   Housing Stability: Not on file       Family History   Problem Relation Age of Onset    Diabetes Mother     Diabetes Father     Heart disease Father     Diabetes Sister     Diabetes Brother     No Known Problems Daughter     No Known Problems Maternal Grandmother     No Known Problems Maternal Grandfather     No Known Problems Paternal Grandmother     No Known Problems Paternal Grandfather     No Known Problems Sister     No Known Problems Daughter

## 2024-07-22 ENCOUNTER — OFFICE VISIT (OUTPATIENT)
Dept: OBGYN CLINIC | Facility: MEDICAL CENTER | Age: 44
End: 2024-07-22
Payer: COMMERCIAL

## 2024-07-22 VITALS
HEART RATE: 68 BPM | SYSTOLIC BLOOD PRESSURE: 112 MMHG | BODY MASS INDEX: 31.53 KG/M2 | WEIGHT: 167 LBS | DIASTOLIC BLOOD PRESSURE: 70 MMHG | HEIGHT: 61 IN

## 2024-07-22 DIAGNOSIS — M77.12 LEFT TENNIS ELBOW: Primary | ICD-10-CM

## 2024-07-22 PROCEDURE — 99212 OFFICE O/P EST SF 10 MIN: CPT | Performed by: EMERGENCY MEDICINE

## 2024-07-22 NOTE — PROGRESS NOTES
"    Assessment/Plan:    Diagnoses and all orders for this visit:    Left tennis elbow    Patient much improved status post NSAIDs and elbow strap.  Will continue to monitor her symptoms we did discuss that this is a self resolving issue and may take another month or 2 to resolve.  She is to use pain as her guide.    Return if symptoms worsen or fail to improve.      Subjective:   Patient ID: Vania Baird is a 44 y.o. female.    Patient returns     Initial note:  RHD NP presents for Left lateral elbow pain since March with no injury worse with lifting.  She has tried Tylenol for her pain, occasional ice      Review of Systems    The following portions of the patient's chart were reviewed and updated as appropriate:   Allergy:  No Known Allergies    Medications:    Current Outpatient Medications:   •  omeprazole (PriLOSEC) 20 mg delayed release capsule, TAKE 1 CAPSULE BY MOUTH EVERY DAY (Patient not taking: Reported on 8/8/2023), Disp: 90 capsule, Rfl: 3    Patient Active Problem List   Diagnosis   • Gastroesophageal reflux disease   • Status post bilateral salpingectomy   • Mechanical breakdown of intrauterine contraceptive device   • Status post hysteroscopy w/ IUD removal   • Calculus of gallbladder with acute on chronic cholecystitis without obstruction   • Obesity (BMI 30.0-34.9)   • Class 1 obesity due to excess calories with body mass index (BMI) of 30.0 to 30.9 in adult   • Family history of diabetes mellitus type II   • Left tennis elbow       Objective:  /70   Pulse 68   Ht 5' 1\" (1.549 m)   Wt 75.8 kg (167 lb)   BMI 31.55 kg/m²     Ortho Exam    Physical Exam      Neurologic Exam    Procedures    There are no pertinent studies obtained with regards to today's office visit.            Past Medical History:   Diagnosis Date   • Calculus of gallbladder    • GERD (gastroesophageal reflux disease)    • Mosquito bite     legs       Past Surgical History:   Procedure Laterality Date   • ANKLE FRACTURE " SURGERY Left    •  SECTION     • OR HYSTEROSCOPY DIAGNOSTIC SEPARATE PROCEDURE N/A 2022    Procedure: HYSTEROSCOPY;  Surgeon: Jane Mayo MD;  [facility];  Service: Gynecology   • OR LAPAROSCOPY SURG CHOLECYSTECTOMY N/A 2023    Procedure: CHOLECYSTECTOMY LAP;  Surgeon: Carlos Enrique Goins MD;  [facility];  Service: General   • OR LAPAROSCOPY W/RMVL ADNEXAL STRUCTURES Bilateral 2022    Procedure: SALPINGECTOMY, LAPAROSCOPIC;  Surgeon: Jane Mayo MD;  [facility];  Service: Gynecology   • OR REMOVAL INTRAUTERINE DEVICE IUD N/A 2022    Procedure: Hysteroscopic removal of IUD portion;  Surgeon: Jane Mayo MD;  [facility];  Service: Gynecology       Social History     Socioeconomic History   • Marital status: Single     Spouse name: Not on file   • Number of children: Not on file   • Years of education: Not on file   • Highest education level: Not on file   Occupational History   • Not on file   Tobacco Use   • Smoking status: Never   • Smokeless tobacco: Never   Vaping Use   • Vaping status: Never Used   Substance and Sexual Activity   • Alcohol use: Never   • Drug use: Never   • Sexual activity: Yes     Partners: Male     Birth control/protection: Female Sterilization   Other Topics Concern   • Not on file   Social History Narrative   • Not on file     Social Determinants of Health     Financial Resource Strain: Not on file   Food Insecurity: Not on file   Transportation Needs: Not on file   Physical Activity: Not on file   Stress: Not on file   Social Connections: Not on file   Intimate Partner Violence: Not on file   Housing Stability: Not on file       Family History   Problem Relation Age of Onset   • Diabetes Mother    • Diabetes Father    • Heart disease Father    • Diabetes Sister    • Diabetes Brother    • No Known Problems Daughter    • No Known Problems Maternal Grandmother    • No Known Problems Maternal Grandfather    •  No Known Problems Paternal Grandmother    • No Known Problems Paternal Grandfather    • No Known Problems Sister    • No Known Problems Daughter

## 2024-08-20 ENCOUNTER — HOSPITAL ENCOUNTER (OUTPATIENT)
Dept: MAMMOGRAPHY | Facility: MEDICAL CENTER | Age: 44
Discharge: HOME | End: 2024-08-20
Payer: COMMERCIAL

## 2024-08-20 VITALS — BODY MASS INDEX: 31.53 KG/M2 | WEIGHT: 167 LBS | HEIGHT: 61 IN

## 2024-08-20 DIAGNOSIS — Z12.31 ENCOUNTER FOR SCREENING MAMMOGRAM FOR MALIGNANT NEOPLASM OF BREAST: ICD-10-CM

## 2024-08-20 PROCEDURE — 77063 BREAST TOMOSYNTHESIS BI: CPT

## 2024-08-20 PROCEDURE — 77067 SCR MAMMO BI INCL CAD: CPT

## 2024-10-01 ENCOUNTER — ANNUAL EXAM (OUTPATIENT)
Dept: OBGYN CLINIC | Facility: MEDICAL CENTER | Age: 44
End: 2024-10-01
Payer: COMMERCIAL

## 2024-10-01 VITALS
SYSTOLIC BLOOD PRESSURE: 106 MMHG | HEIGHT: 61 IN | DIASTOLIC BLOOD PRESSURE: 72 MMHG | BODY MASS INDEX: 31.78 KG/M2 | WEIGHT: 168.3 LBS

## 2024-10-01 DIAGNOSIS — Z01.419 WELL WOMAN EXAM WITH ROUTINE GYNECOLOGICAL EXAM: Primary | ICD-10-CM

## 2024-10-01 DIAGNOSIS — Z12.31 ENCOUNTER FOR SCREENING MAMMOGRAM FOR MALIGNANT NEOPLASM OF BREAST: ICD-10-CM

## 2024-10-01 DIAGNOSIS — Z12.11 COLON CANCER SCREENING: ICD-10-CM

## 2024-10-01 PROCEDURE — 99396 PREV VISIT EST AGE 40-64: CPT | Performed by: OBSTETRICS & GYNECOLOGY

## 2024-10-01 NOTE — PROGRESS NOTES
Assessment   44 y.o.  presenting for annual exam.     Plan   Diagnoses and all orders for this visit:    Well woman exam with routine gynecological exam  - Pap up to date  - Mammo ordered  - Colonoscopy due; referral given  - Return in 1yr for yearly    Encounter for screening mammogram for malignant neoplasm of breast  -     Mammo screening bilateral w 3d and cad; Future    Colon cancer screening  -     Ambulatory Referral to Gastroenterology; Future    __________________________________________________________________      Subjective     Vania Baird is a 44 y.o.  with regular menses who is sexually active and s/p lap BS presenting for yearly.    Pimple on labia. Present for a while but psb getting larger. Present for approx 1yr. No pain or draining.       GYN  Complaints: as above  Denies dyspareunia, genital discharge, genital ulcers, irregular/heavy menses and vulvar/vaginal symptoms  Periods are regular every 21-25d, lasting 4 days.  Dysmenorrhea: none.   Cyclic symptoms include none  Sexually active: Yes - single partner - male  Hx STI: reports   Hx Abnormal pap: reports HPV  Last pap:  - nilm, hpv neg     OB   (c/s x2)  Pregnancy complications: denies  s/p BS       Complaints: denies  Denies urinary frequency, hematuria, urinary incontinence and dysuria     BREAST  Complaints: denies  Denies: breast lump, breast tenderness, changed mole, dryness, nipple discharge, pruritus, rash, skin color change and skin lesion(s)  Last mammogram:  - [email or web address]  Personal hx: denies  Family hx: denies fhx of breast, uterine, ovarian, or colon cancers  Patient does do regular self-exams     GENERAL  PMH reviewed/updated and is as below.   Patient does follow with a PCP.  Works as a health assistant at grade school.  Denies domestic violence.  Exercise: walking  Diet: nothing specific     SCREENING  Cervical Ca: pap up to date  Breast Ca: mammo up to date; next ordered  Colon Ca: discussed options;  "agreeable to colonoscopy referral      Past Medical History:   Diagnosis Date    Calculus of gallbladder     GERD (gastroesophageal reflux disease)     Mosquito bite     legs       Past Surgical History:   Procedure Laterality Date    ANKLE FRACTURE SURGERY Left      SECTION      NM HYSTEROSCOPY DIAGNOSTIC SEPARATE PROCEDURE N/A 2022    Procedure: HYSTEROSCOPY;  Surgeon: Jane Mayo MD;  [facility];  Service: Gynecology    NM LAPAROSCOPY SURG CHOLECYSTECTOMY N/A 2023    Procedure: CHOLECYSTECTOMY LAP;  Surgeon: Carlos Enrique Gonis MD;  [facility];  Service: General    NM LAPAROSCOPY W/RMVL ADNEXAL STRUCTURES Bilateral 2022    Procedure: SALPINGECTOMY, LAPAROSCOPIC;  Surgeon: Jane Mayo MD;  [facility];  Service: Gynecology    NM REMOVAL INTRAUTERINE DEVICE IUD N/A 2022    Procedure: Hysteroscopic removal of IUD portion;  Surgeon: Jane Mayo MD;  [facility];  Service: Gynecology       No current outpatient medications on file.    No Known Allergies    Social History     Tobacco Use    Smoking status: Never    Smokeless tobacco: Never   Vaping Use    Vaping status: Never Used   Substance Use Topics    Alcohol use: Yes     Comment: social    Drug use: Never             Objective  /72   Ht 5' 1\" (1.549 m)   Wt 76.3 kg (168 lb 4.8 oz)   LMP 2024 (Exact Date)   BMI 31.80 kg/m²      Physical Exam:  Physical Exam  Exam conducted with a chaperone present.   Constitutional:       General: She is not in acute distress.     Appearance: Normal appearance. She is well-developed. She is not ill-appearing, toxic-appearing or diaphoretic.   HENT:      Head: Normocephalic and atraumatic.   Eyes:      General: No scleral icterus.        Right eye: No discharge.         Left eye: No discharge.      Conjunctiva/sclera: Conjunctivae normal.   Cardiovascular:      Rate and Rhythm: Normal rate.   Pulmonary:      Effort: Pulmonary effort " is normal. No accessory muscle usage or respiratory distress.   Chest:   Breasts:     Breasts are symmetrical.      Right: No inverted nipple, mass, nipple discharge, skin change or tenderness.      Left: No inverted nipple, mass, nipple discharge, skin change or tenderness.   Abdominal:      General: There is no distension.      Palpations: Abdomen is soft. There is no mass.      Tenderness: There is no abdominal tenderness. There is no guarding or rebound.   Genitourinary:     General: Normal vulva.      Exam position: Lithotomy position.      Labia:         Right: No rash, tenderness or lesion.         Left: No rash, tenderness or lesion.       Urethra: No prolapse, urethral swelling or urethral lesion.      Vagina: No signs of injury. No vaginal discharge, erythema, tenderness, bleeding or lesions.      Cervix: No cervical motion tenderness, discharge, friability, lesion or erythema.      Uterus: Not enlarged, not fixed and not tender.       Adnexa:         Right: No mass, tenderness or fullness.          Left: No mass, tenderness or fullness.        Rectum: No external hemorrhoid. Normal anal tone.      Comments: Flat folliculitis, nondraining  Lymphadenopathy:      Upper Body:      Right upper body: No axillary or pectoral adenopathy.      Left upper body: No axillary or pectoral adenopathy.   Skin:     General: Skin is warm and dry.      Findings: No erythema or rash.   Neurological:      Mental Status: She is alert.   Psychiatric:         Mood and Affect: Mood normal.         Behavior: Behavior normal.         Thought Content: Thought content normal.         Judgment: Judgment normal.

## 2024-11-11 NOTE — PROGRESS NOTES
Ambulatory Visit  Name: Vania Baird      : 1980      MRN: [id number]  Encounter Provider: Cristhian Robertson MD  Encounter Date: 2024   Encounter department: [facility] [place]    Assessment & Plan  Screening for colon cancer  - discussed the risks/benefits/alternatives of colonoscopy, the importance of preparations and the need for escort. Patient agreed to proceed with screening colonoscopy  - schedule colonoscopy w/ [facility] at [place]   Orders:    Colonoscopy; Future    polyethylene glycol (GOLYTELY) 4000 mL solution; Take 4,000 mL by mouth once for 1 dose    Heartburn  Occasional heartburn  - discussed lifestyle modification; given very rare episodes of heartburn, no need for prescription medication. Can try Tums as needed        RTC prn    History of Present Illness     Vania Baird is a 44 y.o. female who presents for crc screening    Referred by gyn  Hx of  x2, CCY (), tubal ligation, left ankle screw present  Seldom NSAID use - last used about 2 weeks ago  Reports intermittent heartburn - 3 times monthly. Usually with certain food but can't remember what. Does not eat spicy food.   Denies dysphagia, odynophagia, nausea, vomiting, abdominal pain, unintentional weight loss, melena, BRBPR, diarrhea, or constipation.     Never colonoscopy.  EGD 2022: 2 cm HH, bx negative for H. pylori and celiac disease    Fhx: no cancer  Shx: negative toxic habits x3      History obtained from : patient  Review of Systems  No current outpatient medications on file prior to visit.     No current facility-administered medications on file prior to visit.          Objective     There were no vitals taken for this visit.    Physical Exam  Vitals reviewed.   Constitutional:       Appearance: Normal appearance.   HENT:      Head: Normocephalic and atraumatic.   Cardiovascular:      Rate and Rhythm: Normal rate.   Pulmonary:      Effort: Pulmonary effort is normal. No  respiratory distress.   Abdominal:      General: There is no distension.      Palpations: Abdomen is soft.      Tenderness: There is no abdominal tenderness.      Comments: Old lap scar present   Musculoskeletal:         General: No swelling.   Skin:     General: Skin is warm and dry.   Neurological:      General: No focal deficit present.      Mental Status: She is alert.

## 2024-11-12 ENCOUNTER — OFFICE VISIT (OUTPATIENT)
Dept: GASTROENTEROLOGY | Facility: MEDICAL CENTER | Age: 44
End: 2024-11-12
Payer: COMMERCIAL

## 2024-11-12 ENCOUNTER — TELEPHONE (OUTPATIENT)
Dept: GASTROENTEROLOGY | Facility: MEDICAL CENTER | Age: 44
End: 2024-11-12

## 2024-11-12 VITALS
BODY MASS INDEX: 31.55 KG/M2 | SYSTOLIC BLOOD PRESSURE: 127 MMHG | WEIGHT: 167 LBS | TEMPERATURE: 97.2 F | DIASTOLIC BLOOD PRESSURE: 88 MMHG | HEART RATE: 70 BPM

## 2024-11-12 DIAGNOSIS — R12 HEARTBURN: ICD-10-CM

## 2024-11-12 DIAGNOSIS — Z12.11 SCREENING FOR COLON CANCER: Primary | ICD-10-CM

## 2024-11-12 PROCEDURE — 99244 OFF/OP CNSLTJ NEW/EST MOD 40: CPT | Performed by: INTERNAL MEDICINE

## 2024-11-12 RX ORDER — SODIUM CHLORIDE, SODIUM LACTATE, POTASSIUM CHLORIDE, CALCIUM CHLORIDE 600; 310; 30; 20 MG/100ML; MG/100ML; MG/100ML; MG/100ML
125 INJECTION, SOLUTION INTRAVENOUS CONTINUOUS
OUTPATIENT
Start: 2024-11-12

## 2024-11-12 NOTE — TELEPHONE ENCOUNTER
Procedure: Colonoscopy  Date: 01/10/2025  Physician performing: Dr. Robertson  Location of procedure:  [facility]  Instructions given to patient: Yes  Diabetic: No  Clearances: NA

## 2024-12-23 ENCOUNTER — TELEPHONE (OUTPATIENT)
Dept: GASTROENTEROLOGY | Facility: MEDICAL CENTER | Age: 44
End: 2024-12-23

## 2024-12-23 NOTE — TELEPHONE ENCOUNTER
Sent pt MYC message to confirm upcoming Colonoscopy scheduled for 1/10/25 with  at [facility].

## 2025-01-03 ENCOUNTER — ANESTHESIA EVENT (OUTPATIENT)
Dept: ANESTHESIOLOGY | Facility: HOSPITAL | Age: 45
End: 2025-01-03

## 2025-01-03 ENCOUNTER — ANESTHESIA (OUTPATIENT)
Dept: ANESTHESIOLOGY | Facility: HOSPITAL | Age: 45
End: 2025-01-03

## 2025-01-10 ENCOUNTER — ANESTHESIA EVENT (OUTPATIENT)
Dept: GASTROENTEROLOGY | Facility: MEDICAL CENTER | Age: 45
End: 2025-01-10
Payer: COMMERCIAL

## 2025-01-10 ENCOUNTER — HOSPITAL ENCOUNTER (OUTPATIENT)
Dept: GASTROENTEROLOGY | Facility: MEDICAL CENTER | Age: 45
Setting detail: OUTPATIENT SURGERY
Discharge: HOME | End: 2025-01-10
Admitting: INTERNAL MEDICINE
Payer: COMMERCIAL

## 2025-01-10 ENCOUNTER — ANESTHESIA (OUTPATIENT)
Dept: GASTROENTEROLOGY | Facility: MEDICAL CENTER | Age: 45
End: 2025-01-10
Payer: COMMERCIAL

## 2025-01-10 VITALS
TEMPERATURE: 96.9 F | DIASTOLIC BLOOD PRESSURE: 69 MMHG | HEIGHT: 61 IN | OXYGEN SATURATION: 100 % | HEART RATE: 82 BPM | WEIGHT: 161 LBS | RESPIRATION RATE: 16 BRPM | SYSTOLIC BLOOD PRESSURE: 112 MMHG | BODY MASS INDEX: 30.4 KG/M2

## 2025-01-10 DIAGNOSIS — Z12.11 SCREENING FOR COLON CANCER: ICD-10-CM

## 2025-01-10 PROCEDURE — 45378 DIAGNOSTIC COLONOSCOPY: CPT | Performed by: INTERNAL MEDICINE

## 2025-01-10 RX ORDER — PROPOFOL 10 MG/ML
INJECTION, EMULSION INTRAVENOUS AS NEEDED
Status: DISCONTINUED | OUTPATIENT
Start: 2025-01-10 | End: 2025-01-10

## 2025-01-10 RX ORDER — PROPOFOL 10 MG/ML
INJECTION, EMULSION INTRAVENOUS CONTINUOUS PRN
Status: DISCONTINUED | OUTPATIENT
Start: 2025-01-10 | End: 2025-01-10

## 2025-01-10 RX ORDER — EPHEDRINE SULFATE 50 MG/ML
INJECTION INTRAVENOUS AS NEEDED
Status: DISCONTINUED | OUTPATIENT
Start: 2025-01-10 | End: 2025-01-10

## 2025-01-10 RX ORDER — LIDOCAINE HYDROCHLORIDE 20 MG/ML
INJECTION, SOLUTION EPIDURAL; INFILTRATION; INTRACAUDAL; PERINEURAL AS NEEDED
Status: DISCONTINUED | OUTPATIENT
Start: 2025-01-10 | End: 2025-01-10

## 2025-01-10 RX ORDER — SODIUM CHLORIDE, SODIUM LACTATE, POTASSIUM CHLORIDE, CALCIUM CHLORIDE 600; 310; 30; 20 MG/100ML; MG/100ML; MG/100ML; MG/100ML
125 INJECTION, SOLUTION INTRAVENOUS CONTINUOUS
Status: DISCONTINUED | OUTPATIENT
Start: 2025-01-10 | End: 2025-01-10

## 2025-01-10 RX ADMIN — PROPOFOL 150 MCG/KG/MIN: 10 INJECTION, EMULSION INTRAVENOUS at 14:01

## 2025-01-10 RX ADMIN — PROPOFOL 50 MG: 10 INJECTION, EMULSION INTRAVENOUS at 13:56

## 2025-01-10 RX ADMIN — LIDOCAINE HYDROCHLORIDE 80 MG: 20 INJECTION, SOLUTION EPIDURAL; INFILTRATION; INTRACAUDAL at 13:55

## 2025-01-10 RX ADMIN — PROPOFOL 50 MG: 10 INJECTION, EMULSION INTRAVENOUS at 14:01

## 2025-01-10 RX ADMIN — PROPOFOL 30 MG: 10 INJECTION, EMULSION INTRAVENOUS at 14:11

## 2025-01-10 RX ADMIN — PROPOFOL 100 MG: 10 INJECTION, EMULSION INTRAVENOUS at 13:55

## 2025-01-10 RX ADMIN — Medication 40 MG: at 14:00

## 2025-01-10 RX ADMIN — PROPOFOL 120 MCG/KG/MIN: 10 INJECTION, EMULSION INTRAVENOUS at 13:58

## 2025-01-10 RX ADMIN — EPHEDRINE SULFATE 5 MG: 50 INJECTION INTRAVENOUS at 14:00

## 2025-01-10 RX ADMIN — SODIUM CHLORIDE 1000 ML: 0.9 INJECTION, SOLUTION INTRAVENOUS at 13:16

## 2025-01-10 NOTE — ANESTHESIA POSTPROCEDURE EVALUATION
Post-Op Assessment Note    CV Status:  Stable (IVF being given for hypotension)  Pain Score: 0    Pain management: adequate       Mental Status:  Sleepy and arousable   Hydration Status:  Euvolemic   PONV Controlled:  Controlled   Airway Patency:  Patent     Post Op Vitals Reviewed: Yes    No anethesia notable event occurred.    Staff: Anesthesiologist, CRNA           Last Filed PACU Vitals:  Vitals Value Taken Time   Temp     Pulse 64    BP 83/62    Resp 20    SpO2 99

## 2025-01-10 NOTE — H&P
History and Physical - [facility]  Vania Baird 44 y.o. female MRN: [id number]                  HPI: Vania Baird is a 44 y.o. year old female who presents for crc screening      REVIEW OF SYSTEMS: Per the HPI, and otherwise unremarkable.    Historical Information   Past Medical History:   Diagnosis Date    Calculus of gallbladder     GERD (gastroesophageal reflux disease)     Mosquito bite     legs     Past Surgical History:   Procedure Laterality Date    ANKLE FRACTURE SURGERY Left      SECTION      HYSTERECTOMY      OH HYSTEROSCOPY DIAGNOSTIC SEPARATE PROCEDURE N/A 2022    Procedure: HYSTEROSCOPY;  Surgeon: Jane Mayo MD;  Location: [facility];  Service: Gynecology    OH LAPAROSCOPY SURG CHOLECYSTECTOMY N/A 2023    Procedure: CHOLECYSTECTOMY LAP;  Surgeon: Carlos Enrique Goins MD;  Location: [facility];  Service: General    OH LAPAROSCOPY W/RMVL ADNEXAL STRUCTURES Bilateral 2022    Procedure: SALPINGECTOMY, LAPAROSCOPIC;  Surgeon: Jane Mayo MD;  Location: [facility];  Service: Gynecology    OH REMOVAL INTRAUTERINE DEVICE IUD N/A 2022    Procedure: Hysteroscopic removal of IUD portion;  Surgeon: Jane Mayo MD;  Location: [facility];  Service: Gynecology    TUBAL LIGATION Bilateral          Social History   Social History     Substance and Sexual Activity   Alcohol Use Yes    Comment: social     Social History     Substance and Sexual Activity   Drug Use Never     Social History     Tobacco Use   Smoking Status Never   Smokeless Tobacco Never     Family History   Problem Relation Age of Onset    Diabetes Mother     Diabetes Father     Heart disease Father     Diabetes Sister     No Known Problems Sister     No Known Problems Daughter     No Known Problems Daughter     No Known Problems Maternal Grandmother     No Known Problems Maternal Grandfather     No Known Problems Paternal Grandmother     No Known Problems Paternal  "Grandfather     Diabetes Brother     No Known Problems Maternal Aunt     No Known Problems Maternal Aunt     No Known Problems Maternal Aunt     No Known Problems Maternal Aunt     No Known Problems Maternal Aunt        Meds/Allergies       Current Outpatient Medications:     polyethylene glycol (GOLYTELY) 4000 mL solution  No current facility-administered medications for this encounter.    No Known Allergies    Objective     /67   Pulse 79   Temp (!) 96.9 °F (36.1 °C) (Temporal)   Resp 16   Ht 5' 1\" (1.549 m)   Wt 73 kg (161 lb)   SpO2 100%   BMI 30.42 kg/m²       PHYSICAL EXAM    Gen: NAD  Head: NCAT  CV: RRR  CHEST: not in respiratory distress  ABD: soft, NT/ND  EXT: no edema      ASSESSMENT/PLAN:  This is a 44 y.o. year old female here for colonoscopy, and she is stable and optimized for her procedure.        "

## 2025-08-20 ENCOUNTER — RESULTS FOLLOW-UP (OUTPATIENT)
Dept: FAMILY MEDICINE CLINIC | Facility: CLINIC | Age: 45
End: 2025-08-20

## 2025-08-20 ENCOUNTER — OFFICE VISIT (OUTPATIENT)
Dept: FAMILY MEDICINE CLINIC | Facility: CLINIC | Age: 45
End: 2025-08-20
Payer: COMMERCIAL

## 2025-08-20 VITALS
BODY MASS INDEX: 30.48 KG/M2 | HEART RATE: 86 BPM | HEIGHT: 63 IN | SYSTOLIC BLOOD PRESSURE: 124 MMHG | DIASTOLIC BLOOD PRESSURE: 80 MMHG | TEMPERATURE: 97.8 F | OXYGEN SATURATION: 99 % | WEIGHT: 172 LBS | RESPIRATION RATE: 18 BRPM

## 2025-08-20 DIAGNOSIS — E66.811 OBESITY (BMI 30.0-34.9): ICD-10-CM

## 2025-08-20 DIAGNOSIS — E55.9 VITAMIN D DEFICIENCY: ICD-10-CM

## 2025-08-20 DIAGNOSIS — F32.A DEPRESSION, UNSPECIFIED DEPRESSION TYPE: ICD-10-CM

## 2025-08-20 DIAGNOSIS — Z13.220 SCREENING FOR HYPERLIPIDEMIA: ICD-10-CM

## 2025-08-20 DIAGNOSIS — Z00.00 ANNUAL PHYSICAL EXAM: Primary | ICD-10-CM

## 2025-08-20 PROBLEM — K21.9 GASTROESOPHAGEAL REFLUX DISEASE: Status: RESOLVED | Noted: 2022-07-25 | Resolved: 2025-08-20

## 2025-08-20 PROCEDURE — 99396 PREV VISIT EST AGE 40-64: CPT | Performed by: FAMILY MEDICINE

## (undated) DEVICE — SUT VICRYL 0 UR-6 27 IN J603H

## (undated) DEVICE — TUBING SMOKE EVAC W/FILTRATION DEVICE PLUMEPORT ACTIV

## (undated) DEVICE — GLOVE INDICATOR PI UNDERGLOVE SZ 6.5 BLUE

## (undated) DEVICE — LIGAMAX 5 MM ENDOSCOPIC MULTIPLE CLIP APPLIER: Brand: LIGAMAX

## (undated) DEVICE — CYSTO TUBING SINGLE IRRIGATION

## (undated) DEVICE — INTENDED FOR TISSUE SEPARATION, AND OTHER PROCEDURES THAT REQUIRE A SHARP SURGICAL BLADE TO PUNCTURE OR CUT.: Brand: BARD-PARKER SAFETY BLADES SIZE 11, STERILE

## (undated) DEVICE — BLUE HEAT SCOPE WARMER

## (undated) DEVICE — SUT MONOCRYL 4-0 PS-2 27 IN Y426H

## (undated) DEVICE — BETHLEHEM UNIVERSAL GYN LAP PK: Brand: CARDINAL HEALTH

## (undated) DEVICE — LIGHT GLOVE GREEN

## (undated) DEVICE — ELECTRODE LAP J HOOK SPLIT STEM E-Z CLEAN 33CM -0021S

## (undated) DEVICE — ADHESIVE SKIN HIGH VISCOSITY EXOFIN 1ML

## (undated) DEVICE — TISSUE RETRIEVAL SYSTEM: Brand: INZII RETRIEVAL SYSTEM

## (undated) DEVICE — IV EXTENSION TUBING 33 IN

## (undated) DEVICE — BETHLEHEM UNIVERSAL MINOR VAG: Brand: CARDINAL HEALTH

## (undated) DEVICE — GLOVE PI ULTRA TOUCH SZ.7.0

## (undated) DEVICE — ALLENTOWN LAP CHOLE APP PACK: Brand: CARDINAL HEALTH

## (undated) DEVICE — CHLORAPREP HI-LITE 26ML ORANGE

## (undated) DEVICE — 3M™ STERI-DRAPE™ UNDER BUTTOCKS DRAPE WITH POUCH 1084: Brand: STERI-DRAPE™

## (undated) DEVICE — [HIGH FLOW INSUFFLATOR,  DO NOT USE IF PACKAGE IS DAMAGED,  KEEP DRY,  KEEP AWAY FROM SUNLIGHT,  PROTECT FROM HEAT AND RADIOACTIVE SOURCES.]: Brand: PNEUMOSURE

## (undated) DEVICE — GLOVE SRG BIOGEL 6.5

## (undated) DEVICE — PVC URETHRAL CATHETER: Brand: DOVER

## (undated) DEVICE — PREMIUM DRY TRAY LF: Brand: MEDLINE INDUSTRIES, INC.

## (undated) DEVICE — PMI DISPOSABLE PUNCTURE CLOSURE DEVICE / SUTURE GRASPER: Brand: PMI

## (undated) DEVICE — GLOVE INDICATOR PI UNDERGLOVE SZ 8 BLUE

## (undated) DEVICE — 2000CC GUARDIAN II: Brand: GUARDIAN

## (undated) DEVICE — TROCAR: Brand: KII SLEEVE

## (undated) DEVICE — SCD SEQUENTIAL COMPRESSION COMFORT SLEEVE MEDIUM KNEE LENGTH: Brand: KENDALL SCD

## (undated) DEVICE — SYRINGE 30ML LL

## (undated) DEVICE — DRAPE EQUIPMENT RF WAND

## (undated) DEVICE — SYRINGE 20ML LL

## (undated) DEVICE — GLOVE INDICATOR PI UNDERGLOVE SZ 7 BLUE

## (undated) DEVICE — STRL ALLENTOWN HYSTEROSCOPY PK: Brand: CARDINAL HEALTH

## (undated) DEVICE — DISPOSABLE OR TOWEL: Brand: CARDINAL HEALTH

## (undated) DEVICE — TROCAR: Brand: KII FIOS FIRST ENTRY

## (undated) DEVICE — PLUMEPEN PRO 10FT

## (undated) DEVICE — METZENBAUM ADTEC SINGLE USE DISSECTING SCISSORS, SHAFT ONLY, MONOPOLAR, CURVED TO LEFT, WORKING LENGTH: 12 1/4", (310 MM), DIAM. 5 MM, INSULATED, DOUBLE ACTION, STERILE, DISPOSABLE, PACKAGE OF 10 PIECES: Brand: AESCULAP

## (undated) DEVICE — 3000CC GUARDIAN II: Brand: GUARDIAN

## (undated) DEVICE — GLOVE SRG BIOGEL ECLIPSE 7.5